# Patient Record
Sex: MALE | Race: WHITE | NOT HISPANIC OR LATINO | Employment: OTHER | URBAN - METROPOLITAN AREA
[De-identification: names, ages, dates, MRNs, and addresses within clinical notes are randomized per-mention and may not be internally consistent; named-entity substitution may affect disease eponyms.]

---

## 2017-01-17 ENCOUNTER — COMMUNICATION - HEALTHEAST (OUTPATIENT)
Dept: FAMILY MEDICINE | Facility: CLINIC | Age: 72
End: 2017-01-17

## 2017-01-17 DIAGNOSIS — M72.0 DUPUYTREN'S CONTRACTURE: ICD-10-CM

## 2017-02-02 ENCOUNTER — RECORDS - HEALTHEAST (OUTPATIENT)
Dept: ADMINISTRATIVE | Facility: OTHER | Age: 72
End: 2017-02-02

## 2017-04-07 ENCOUNTER — OFFICE VISIT - HEALTHEAST (OUTPATIENT)
Dept: FAMILY MEDICINE | Facility: CLINIC | Age: 72
End: 2017-04-07

## 2017-04-07 DIAGNOSIS — D12.6 BENIGN NEOPLASM OF COLON: ICD-10-CM

## 2017-04-07 DIAGNOSIS — E78.5 HYPERLIPIDEMIA: ICD-10-CM

## 2017-04-07 DIAGNOSIS — C61 MALIGNANT NEOPLASM OF PROSTATE (H): ICD-10-CM

## 2017-04-07 DIAGNOSIS — R10.30 GROIN PAIN: ICD-10-CM

## 2017-04-07 DIAGNOSIS — Z00.00 ROUTINE GENERAL MEDICAL EXAMINATION AT A HEALTH CARE FACILITY: ICD-10-CM

## 2017-04-07 LAB
CHOLEST SERPL-MCNC: 191 MG/DL
FASTING STATUS PATIENT QL REPORTED: YES
HDLC SERPL-MCNC: 52 MG/DL
LDLC SERPL CALC-MCNC: 112 MG/DL
PSA SERPL-MCNC: <0.1 NG/ML (ref 0–4.5)
TRIGL SERPL-MCNC: 134 MG/DL

## 2017-04-07 ASSESSMENT — MIFFLIN-ST. JEOR: SCORE: 1462.27

## 2017-04-19 ENCOUNTER — COMMUNICATION - HEALTHEAST (OUTPATIENT)
Dept: FAMILY MEDICINE | Facility: CLINIC | Age: 72
End: 2017-04-19

## 2017-04-20 ENCOUNTER — COMMUNICATION - HEALTHEAST (OUTPATIENT)
Dept: FAMILY MEDICINE | Facility: CLINIC | Age: 72
End: 2017-04-20

## 2017-04-24 ENCOUNTER — COMMUNICATION - HEALTHEAST (OUTPATIENT)
Dept: FAMILY MEDICINE | Facility: CLINIC | Age: 72
End: 2017-04-24

## 2017-04-24 DIAGNOSIS — E78.5 HYPERLIPIDEMIA: ICD-10-CM

## 2017-04-24 DIAGNOSIS — K21.9 GERD (GASTROESOPHAGEAL REFLUX DISEASE): ICD-10-CM

## 2017-08-11 ENCOUNTER — COMMUNICATION - HEALTHEAST (OUTPATIENT)
Dept: FAMILY MEDICINE | Facility: CLINIC | Age: 72
End: 2017-08-11

## 2017-08-15 ENCOUNTER — COMMUNICATION - HEALTHEAST (OUTPATIENT)
Dept: FAMILY MEDICINE | Facility: CLINIC | Age: 72
End: 2017-08-15

## 2017-08-15 DIAGNOSIS — M54.50 LOW BACK PAIN: ICD-10-CM

## 2017-08-17 ENCOUNTER — OFFICE VISIT - HEALTHEAST (OUTPATIENT)
Dept: FAMILY MEDICINE | Facility: CLINIC | Age: 72
End: 2017-08-17

## 2017-08-17 DIAGNOSIS — M54.50 LOW BACK PAIN: ICD-10-CM

## 2017-08-17 ASSESSMENT — MIFFLIN-ST. JEOR: SCORE: 1453.2

## 2017-08-21 ENCOUNTER — COMMUNICATION - HEALTHEAST (OUTPATIENT)
Dept: FAMILY MEDICINE | Facility: CLINIC | Age: 72
End: 2017-08-21

## 2017-08-21 DIAGNOSIS — M54.50 LOW BACK PAIN: ICD-10-CM

## 2017-08-29 ENCOUNTER — HOSPITAL ENCOUNTER (OUTPATIENT)
Dept: MRI IMAGING | Facility: HOSPITAL | Age: 72
Discharge: HOME | End: 2017-08-29
Attending: FAMILY MEDICINE

## 2017-08-29 DIAGNOSIS — M54.50 LOW BACK PAIN: ICD-10-CM

## 2017-09-12 ENCOUNTER — COMMUNICATION - HEALTHEAST (OUTPATIENT)
Dept: FAMILY MEDICINE | Facility: CLINIC | Age: 72
End: 2017-09-12

## 2017-09-12 DIAGNOSIS — R93.89 ABNORMAL MRI: ICD-10-CM

## 2017-09-13 ENCOUNTER — COMMUNICATION - HEALTHEAST (OUTPATIENT)
Dept: FAMILY MEDICINE | Facility: CLINIC | Age: 72
End: 2017-09-13

## 2017-09-14 ENCOUNTER — HOSPITAL ENCOUNTER (OUTPATIENT)
Dept: CT IMAGING | Facility: HOSPITAL | Age: 72
Discharge: HOME | End: 2017-09-14
Attending: FAMILY MEDICINE

## 2017-09-14 ENCOUNTER — COMMUNICATION - HEALTHEAST (OUTPATIENT)
Dept: TELEHEALTH | Facility: CLINIC | Age: 72
End: 2017-09-14

## 2017-09-14 DIAGNOSIS — R93.89 ABNORMAL MRI: ICD-10-CM

## 2017-11-06 ENCOUNTER — AMBULATORY - HEALTHEAST (OUTPATIENT)
Dept: NURSING | Facility: CLINIC | Age: 72
End: 2017-11-06

## 2017-11-06 DIAGNOSIS — Z23 NEEDS FLU SHOT: ICD-10-CM

## 2018-04-09 ENCOUNTER — OFFICE VISIT - HEALTHEAST (OUTPATIENT)
Dept: FAMILY MEDICINE | Facility: CLINIC | Age: 73
End: 2018-04-09

## 2018-04-09 DIAGNOSIS — D12.6 BENIGN NEOPLASM OF COLON: ICD-10-CM

## 2018-04-09 DIAGNOSIS — C61 MALIGNANT NEOPLASM OF PROSTATE (H): ICD-10-CM

## 2018-04-09 DIAGNOSIS — Z12.11 SCREEN FOR COLON CANCER: ICD-10-CM

## 2018-04-09 DIAGNOSIS — E78.5 HYPERLIPIDEMIA: ICD-10-CM

## 2018-04-09 DIAGNOSIS — Z00.00 MEDICARE ANNUAL WELLNESS VISIT, SUBSEQUENT: ICD-10-CM

## 2018-04-09 DIAGNOSIS — K21.9 ESOPHAGEAL REFLUX: ICD-10-CM

## 2018-04-09 DIAGNOSIS — N20.0 NEPHROLITHIASIS: ICD-10-CM

## 2018-04-09 LAB
ALBUMIN SERPL-MCNC: 4 G/DL (ref 3.5–5)
ALP SERPL-CCNC: 68 U/L (ref 45–120)
ALT SERPL W P-5'-P-CCNC: 22 U/L (ref 0–45)
ANION GAP SERPL CALCULATED.3IONS-SCNC: 9 MMOL/L (ref 5–18)
AST SERPL W P-5'-P-CCNC: 29 U/L (ref 0–40)
BILIRUB DIRECT SERPL-MCNC: 0.3 MG/DL
BILIRUB SERPL-MCNC: 0.9 MG/DL (ref 0–1)
BUN SERPL-MCNC: 17 MG/DL (ref 8–28)
CALCIUM SERPL-MCNC: 9.4 MG/DL (ref 8.5–10.5)
CHLORIDE BLD-SCNC: 103 MMOL/L (ref 98–107)
CHOLEST SERPL-MCNC: 171 MG/DL
CO2 SERPL-SCNC: 25 MMOL/L (ref 22–31)
CREAT SERPL-MCNC: 0.97 MG/DL (ref 0.7–1.3)
ERYTHROCYTE [DISTWIDTH] IN BLOOD BY AUTOMATED COUNT: 11.9 % (ref 11–14.5)
FASTING STATUS PATIENT QL REPORTED: YES
GFR SERPL CREATININE-BSD FRML MDRD: >60 ML/MIN/1.73M2
GLUCOSE BLD-MCNC: 87 MG/DL (ref 70–125)
HCT VFR BLD AUTO: 42.1 % (ref 40–54)
HDLC SERPL-MCNC: 64 MG/DL
HGB BLD-MCNC: 14.3 G/DL (ref 14–18)
LDLC SERPL CALC-MCNC: 97 MG/DL
MCH RBC QN AUTO: 30.3 PG (ref 27–34)
MCHC RBC AUTO-ENTMCNC: 33.9 G/DL (ref 32–36)
MCV RBC AUTO: 89 FL (ref 80–100)
PLATELET # BLD AUTO: 206 THOU/UL (ref 140–440)
PMV BLD AUTO: 6.7 FL (ref 7–10)
POTASSIUM BLD-SCNC: 4.4 MMOL/L (ref 3.5–5)
PROT SERPL-MCNC: 7.2 G/DL (ref 6–8)
PSA SERPL-MCNC: <0.1 NG/ML (ref 0–6.5)
RBC # BLD AUTO: 4.71 MILL/UL (ref 4.4–6.2)
SODIUM SERPL-SCNC: 137 MMOL/L (ref 136–145)
TRIGL SERPL-MCNC: 48 MG/DL
WBC: 4 THOU/UL (ref 4–11)

## 2018-04-09 ASSESSMENT — MIFFLIN-ST. JEOR: SCORE: 1439.59

## 2018-05-17 ENCOUNTER — OFFICE VISIT - HEALTHEAST (OUTPATIENT)
Dept: FAMILY MEDICINE | Facility: CLINIC | Age: 73
End: 2018-05-17

## 2018-05-17 DIAGNOSIS — H69.90 EUSTACHIAN TUBE DYSFUNCTION: ICD-10-CM

## 2018-05-17 DIAGNOSIS — H93.8X2 PLUGGED FEELING IN EAR, LEFT: ICD-10-CM

## 2018-05-17 DIAGNOSIS — E78.5 HYPERLIPIDEMIA: ICD-10-CM

## 2018-05-17 ASSESSMENT — MIFFLIN-ST. JEOR: SCORE: 1425.98

## 2018-05-28 ENCOUNTER — COMMUNICATION - HEALTHEAST (OUTPATIENT)
Dept: FAMILY MEDICINE | Facility: CLINIC | Age: 73
End: 2018-05-28

## 2018-05-28 DIAGNOSIS — K21.9 GERD (GASTROESOPHAGEAL REFLUX DISEASE): ICD-10-CM

## 2018-06-14 ENCOUNTER — COMMUNICATION - HEALTHEAST (OUTPATIENT)
Dept: SCHEDULING | Facility: CLINIC | Age: 73
End: 2018-06-14

## 2018-06-14 ENCOUNTER — OFFICE VISIT - HEALTHEAST (OUTPATIENT)
Dept: FAMILY MEDICINE | Facility: CLINIC | Age: 73
End: 2018-06-14

## 2018-06-14 DIAGNOSIS — S61.012A LACERATION OF LEFT THUMB WITHOUT FOREIGN BODY WITHOUT DAMAGE TO NAIL, INITIAL ENCOUNTER: ICD-10-CM

## 2018-06-14 ASSESSMENT — MIFFLIN-ST. JEOR: SCORE: 1424.69

## 2018-06-25 ENCOUNTER — OFFICE VISIT - HEALTHEAST (OUTPATIENT)
Dept: FAMILY MEDICINE | Facility: CLINIC | Age: 73
End: 2018-06-25

## 2018-06-25 DIAGNOSIS — Z48.02 ENCOUNTER FOR REMOVAL OF SUTURES: ICD-10-CM

## 2018-06-25 ASSESSMENT — MIFFLIN-ST. JEOR: SCORE: 1425.98

## 2018-06-26 ENCOUNTER — COMMUNICATION - HEALTHEAST (OUTPATIENT)
Dept: FAMILY MEDICINE | Facility: CLINIC | Age: 73
End: 2018-06-26

## 2018-06-26 DIAGNOSIS — E78.5 HYPERLIPIDEMIA: ICD-10-CM

## 2018-09-18 ENCOUNTER — RECORDS - HEALTHEAST (OUTPATIENT)
Dept: ADMINISTRATIVE | Facility: OTHER | Age: 73
End: 2018-09-18

## 2018-10-29 ENCOUNTER — AMBULATORY - HEALTHEAST (OUTPATIENT)
Dept: NURSING | Facility: CLINIC | Age: 73
End: 2018-10-29

## 2018-10-29 DIAGNOSIS — Z23 FLU VACCINE NEED: ICD-10-CM

## 2019-01-17 ENCOUNTER — OFFICE VISIT - HEALTHEAST (OUTPATIENT)
Dept: FAMILY MEDICINE | Facility: CLINIC | Age: 74
End: 2019-01-17

## 2019-01-17 DIAGNOSIS — C61 MALIGNANT NEOPLASM OF PROSTATE (H): ICD-10-CM

## 2019-01-17 DIAGNOSIS — Z01.818 PREOP GENERAL PHYSICAL EXAM: ICD-10-CM

## 2019-01-17 DIAGNOSIS — E78.5 HYPERLIPIDEMIA, UNSPECIFIED HYPERLIPIDEMIA TYPE: ICD-10-CM

## 2019-01-17 DIAGNOSIS — K21.9 GASTROESOPHAGEAL REFLUX DISEASE WITHOUT ESOPHAGITIS: ICD-10-CM

## 2019-01-17 DIAGNOSIS — M72.0 CONTRACTURE OF PALMAR FASCIA: ICD-10-CM

## 2019-01-17 LAB
ALBUMIN SERPL-MCNC: 4.1 G/DL (ref 3.5–5)
ALP SERPL-CCNC: 81 U/L (ref 45–120)
ALT SERPL W P-5'-P-CCNC: 20 U/L (ref 0–45)
ANION GAP SERPL CALCULATED.3IONS-SCNC: 11 MMOL/L (ref 5–18)
AST SERPL W P-5'-P-CCNC: 23 U/L (ref 0–40)
ATRIAL RATE - MUSE: 71 BPM
BILIRUB DIRECT SERPL-MCNC: 0.2 MG/DL
BILIRUB SERPL-MCNC: 0.7 MG/DL (ref 0–1)
BUN SERPL-MCNC: 19 MG/DL (ref 8–28)
CALCIUM SERPL-MCNC: 9.8 MG/DL (ref 8.5–10.5)
CHLORIDE BLD-SCNC: 101 MMOL/L (ref 98–107)
CHOLEST SERPL-MCNC: 171 MG/DL
CO2 SERPL-SCNC: 26 MMOL/L (ref 22–31)
CREAT SERPL-MCNC: 0.94 MG/DL (ref 0.7–1.3)
DIASTOLIC BLOOD PRESSURE - MUSE: NORMAL MMHG
ERYTHROCYTE [DISTWIDTH] IN BLOOD BY AUTOMATED COUNT: 11 % (ref 11–14.5)
FASTING STATUS PATIENT QL REPORTED: YES
GFR SERPL CREATININE-BSD FRML MDRD: >60 ML/MIN/1.73M2
GLUCOSE BLD-MCNC: 100 MG/DL (ref 70–125)
HCT VFR BLD AUTO: 45.2 % (ref 40–54)
HDLC SERPL-MCNC: 55 MG/DL
HGB BLD-MCNC: 15.1 G/DL (ref 14–18)
INTERPRETATION ECG - MUSE: NORMAL
LDLC SERPL CALC-MCNC: 94 MG/DL
MCH RBC QN AUTO: 30.2 PG (ref 27–34)
MCHC RBC AUTO-ENTMCNC: 33.3 G/DL (ref 32–36)
MCV RBC AUTO: 91 FL (ref 80–100)
P AXIS - MUSE: 72 DEGREES
PLATELET # BLD AUTO: 257 THOU/UL (ref 140–440)
PMV BLD AUTO: 6.8 FL (ref 7–10)
POTASSIUM BLD-SCNC: 4.3 MMOL/L (ref 3.5–5)
PR INTERVAL - MUSE: 166 MS
PROT SERPL-MCNC: 7.4 G/DL (ref 6–8)
PSA SERPL-MCNC: <0.1 NG/ML (ref 0–6.5)
QRS DURATION - MUSE: 106 MS
QT - MUSE: 372 MS
QTC - MUSE: 404 MS
R AXIS - MUSE: -23 DEGREES
RBC # BLD AUTO: 5 MILL/UL (ref 4.4–6.2)
SODIUM SERPL-SCNC: 138 MMOL/L (ref 136–145)
SYSTOLIC BLOOD PRESSURE - MUSE: NORMAL MMHG
T AXIS - MUSE: 12 DEGREES
TRIGL SERPL-MCNC: 111 MG/DL
VENTRICULAR RATE- MUSE: 71 BPM
WBC: 5.3 THOU/UL (ref 4–11)

## 2019-01-17 ASSESSMENT — MIFFLIN-ST. JEOR: SCORE: 1448.2

## 2019-02-20 ENCOUNTER — RECORDS - HEALTHEAST (OUTPATIENT)
Dept: ADMINISTRATIVE | Facility: OTHER | Age: 74
End: 2019-02-20

## 2019-05-31 ENCOUNTER — COMMUNICATION - HEALTHEAST (OUTPATIENT)
Dept: FAMILY MEDICINE | Facility: CLINIC | Age: 74
End: 2019-05-31

## 2019-05-31 DIAGNOSIS — K21.9 GERD (GASTROESOPHAGEAL REFLUX DISEASE): ICD-10-CM

## 2019-06-30 ENCOUNTER — COMMUNICATION - HEALTHEAST (OUTPATIENT)
Dept: FAMILY MEDICINE | Facility: CLINIC | Age: 74
End: 2019-06-30

## 2019-06-30 DIAGNOSIS — E78.5 HYPERLIPIDEMIA: ICD-10-CM

## 2019-07-24 ENCOUNTER — RECORDS - HEALTHEAST (OUTPATIENT)
Dept: ADMINISTRATIVE | Facility: OTHER | Age: 74
End: 2019-07-24

## 2019-10-14 ENCOUNTER — AMBULATORY - HEALTHEAST (OUTPATIENT)
Dept: NURSING | Facility: CLINIC | Age: 74
End: 2019-10-14

## 2019-10-14 DIAGNOSIS — Z23 FLU VACCINE NEED: ICD-10-CM

## 2019-12-05 ENCOUNTER — COMMUNICATION - HEALTHEAST (OUTPATIENT)
Dept: FAMILY MEDICINE | Facility: CLINIC | Age: 74
End: 2019-12-05

## 2019-12-05 DIAGNOSIS — K21.9 GERD (GASTROESOPHAGEAL REFLUX DISEASE): ICD-10-CM

## 2020-02-07 ENCOUNTER — COMMUNICATION - HEALTHEAST (OUTPATIENT)
Dept: LAB | Facility: CLINIC | Age: 75
End: 2020-02-07

## 2020-02-07 DIAGNOSIS — Z12.5 SCREENING FOR PROSTATE CANCER: ICD-10-CM

## 2020-02-07 DIAGNOSIS — E78.5 HYPERLIPIDEMIA, UNSPECIFIED HYPERLIPIDEMIA TYPE: ICD-10-CM

## 2020-02-10 ENCOUNTER — COMMUNICATION - HEALTHEAST (OUTPATIENT)
Dept: FAMILY MEDICINE | Facility: CLINIC | Age: 75
End: 2020-02-10

## 2020-02-10 DIAGNOSIS — K21.9 GERD (GASTROESOPHAGEAL REFLUX DISEASE): ICD-10-CM

## 2020-02-15 RX ORDER — PANTOPRAZOLE SODIUM 40 MG/1
TABLET, DELAYED RELEASE ORAL
Qty: 90 TABLET | Refills: 1 | Status: SHIPPED | OUTPATIENT
Start: 2020-02-15 | End: 2021-12-07

## 2020-02-26 ENCOUNTER — AMBULATORY - HEALTHEAST (OUTPATIENT)
Dept: LAB | Facility: CLINIC | Age: 75
End: 2020-02-26

## 2020-02-26 DIAGNOSIS — Z12.5 SCREENING FOR PROSTATE CANCER: ICD-10-CM

## 2020-02-26 DIAGNOSIS — E78.5 HYPERLIPIDEMIA, UNSPECIFIED HYPERLIPIDEMIA TYPE: ICD-10-CM

## 2020-02-26 LAB
ALBUMIN SERPL-MCNC: 4.1 G/DL (ref 3.5–5)
ALP SERPL-CCNC: 67 U/L (ref 45–120)
ALT SERPL W P-5'-P-CCNC: 31 U/L (ref 0–45)
ANION GAP SERPL CALCULATED.3IONS-SCNC: 11 MMOL/L (ref 5–18)
AST SERPL W P-5'-P-CCNC: 28 U/L (ref 0–40)
BILIRUB DIRECT SERPL-MCNC: 0.2 MG/DL
BILIRUB SERPL-MCNC: 0.8 MG/DL (ref 0–1)
BUN SERPL-MCNC: 16 MG/DL (ref 8–28)
CALCIUM SERPL-MCNC: 9.6 MG/DL (ref 8.5–10.5)
CHLORIDE BLD-SCNC: 100 MMOL/L (ref 98–107)
CHOLEST SERPL-MCNC: 192 MG/DL
CO2 SERPL-SCNC: 26 MMOL/L (ref 22–31)
CREAT SERPL-MCNC: 1.18 MG/DL (ref 0.7–1.3)
FASTING STATUS PATIENT QL REPORTED: YES
GFR SERPL CREATININE-BSD FRML MDRD: >60 ML/MIN/1.73M2
GLUCOSE BLD-MCNC: 96 MG/DL (ref 70–125)
HDLC SERPL-MCNC: 61 MG/DL
LDLC SERPL CALC-MCNC: 113 MG/DL
POTASSIUM BLD-SCNC: 4.1 MMOL/L (ref 3.5–5)
PROT SERPL-MCNC: 7.2 G/DL (ref 6–8)
PSA SERPL-MCNC: <0.1 NG/ML (ref 0–6.5)
SODIUM SERPL-SCNC: 137 MMOL/L (ref 136–145)
TRIGL SERPL-MCNC: 91 MG/DL

## 2020-03-09 ENCOUNTER — OFFICE VISIT - HEALTHEAST (OUTPATIENT)
Dept: FAMILY MEDICINE | Facility: CLINIC | Age: 75
End: 2020-03-09

## 2020-03-09 DIAGNOSIS — M72.0 CONTRACTURE OF PALMAR FASCIA: ICD-10-CM

## 2020-03-09 DIAGNOSIS — R13.10 DYSPHAGIA, UNSPECIFIED TYPE: ICD-10-CM

## 2020-03-09 DIAGNOSIS — Z00.00 MEDICARE ANNUAL WELLNESS VISIT, SUBSEQUENT: ICD-10-CM

## 2020-03-09 DIAGNOSIS — K21.9 GASTROESOPHAGEAL REFLUX DISEASE WITHOUT ESOPHAGITIS: ICD-10-CM

## 2020-03-09 DIAGNOSIS — Z85.46 HISTORY OF PROSTATE CANCER: ICD-10-CM

## 2020-03-09 DIAGNOSIS — N20.0 NEPHROLITHIASIS: ICD-10-CM

## 2020-03-09 DIAGNOSIS — E78.5 HYPERLIPIDEMIA, UNSPECIFIED HYPERLIPIDEMIA TYPE: ICD-10-CM

## 2020-03-09 DIAGNOSIS — D12.6 BENIGN NEOPLASM OF COLON, UNSPECIFIED PART OF COLON: ICD-10-CM

## 2020-03-09 ASSESSMENT — MIFFLIN-ST. JEOR: SCORE: 1460.44

## 2020-03-29 ENCOUNTER — COMMUNICATION - HEALTHEAST (OUTPATIENT)
Dept: FAMILY MEDICINE | Facility: CLINIC | Age: 75
End: 2020-03-29

## 2020-04-21 ENCOUNTER — COMMUNICATION - HEALTHEAST (OUTPATIENT)
Dept: FAMILY MEDICINE | Facility: CLINIC | Age: 75
End: 2020-04-21

## 2020-04-21 DIAGNOSIS — R13.10 DYSPHAGIA, UNSPECIFIED TYPE: ICD-10-CM

## 2020-04-21 DIAGNOSIS — K21.9 GASTROESOPHAGEAL REFLUX DISEASE WITHOUT ESOPHAGITIS: ICD-10-CM

## 2020-04-27 ENCOUNTER — RECORDS - HEALTHEAST (OUTPATIENT)
Dept: ADMINISTRATIVE | Facility: OTHER | Age: 75
End: 2020-04-27

## 2020-05-04 ENCOUNTER — RECORDS - HEALTHEAST (OUTPATIENT)
Dept: ADMINISTRATIVE | Facility: OTHER | Age: 75
End: 2020-05-04

## 2020-05-05 ENCOUNTER — RECORDS - HEALTHEAST (OUTPATIENT)
Dept: ADMINISTRATIVE | Facility: OTHER | Age: 75
End: 2020-05-05

## 2020-05-21 ENCOUNTER — HOSPITAL ENCOUNTER (OUTPATIENT)
Dept: RADIOLOGY | Facility: HOSPITAL | Age: 75
Discharge: HOME | End: 2020-05-21
Attending: INTERNAL MEDICINE

## 2020-05-21 DIAGNOSIS — K21.9 GERD WITHOUT ESOPHAGITIS: ICD-10-CM

## 2020-05-21 DIAGNOSIS — K31.9 GASTRIC ERYTHEMA: ICD-10-CM

## 2020-05-29 ENCOUNTER — RECORDS - HEALTHEAST (OUTPATIENT)
Dept: ADMINISTRATIVE | Facility: OTHER | Age: 75
End: 2020-05-29

## 2020-06-18 ENCOUNTER — COMMUNICATION - HEALTHEAST (OUTPATIENT)
Dept: FAMILY MEDICINE | Facility: CLINIC | Age: 75
End: 2020-06-18

## 2020-06-18 DIAGNOSIS — E78.5 HYPERLIPIDEMIA: ICD-10-CM

## 2020-08-25 ENCOUNTER — RECORDS - HEALTHEAST (OUTPATIENT)
Dept: ADMINISTRATIVE | Facility: OTHER | Age: 75
End: 2020-08-25

## 2021-01-12 ENCOUNTER — COMMUNICATION - HEALTHEAST (OUTPATIENT)
Dept: FAMILY MEDICINE | Facility: CLINIC | Age: 76
End: 2021-01-12

## 2021-01-18 ENCOUNTER — OFFICE VISIT - HEALTHEAST (OUTPATIENT)
Dept: FAMILY MEDICINE | Facility: CLINIC | Age: 76
End: 2021-01-18

## 2021-01-18 DIAGNOSIS — M25.531 RIGHT WRIST PAIN: ICD-10-CM

## 2021-01-18 DIAGNOSIS — E78.5 HYPERLIPIDEMIA, UNSPECIFIED HYPERLIPIDEMIA TYPE: ICD-10-CM

## 2021-01-18 DIAGNOSIS — Z12.5 SCREENING FOR PROSTATE CANCER: ICD-10-CM

## 2021-01-18 DIAGNOSIS — Z85.46 HISTORY OF PROSTATE CANCER: ICD-10-CM

## 2021-01-18 DIAGNOSIS — Z11.59 ENCOUNTER FOR HEPATITIS C SCREENING TEST FOR LOW RISK PATIENT: ICD-10-CM

## 2021-01-18 DIAGNOSIS — K21.9 GASTROESOPHAGEAL REFLUX DISEASE WITHOUT ESOPHAGITIS: ICD-10-CM

## 2021-01-18 DIAGNOSIS — Z01.818 PREOPERATIVE EXAMINATION: ICD-10-CM

## 2021-01-18 LAB
ALBUMIN SERPL-MCNC: 4.2 G/DL (ref 3.5–5)
ALP SERPL-CCNC: 71 U/L (ref 45–120)
ALT SERPL W P-5'-P-CCNC: 23 U/L (ref 0–45)
ANION GAP SERPL CALCULATED.3IONS-SCNC: 11 MMOL/L (ref 5–18)
AST SERPL W P-5'-P-CCNC: 28 U/L (ref 0–40)
BILIRUB DIRECT SERPL-MCNC: 0.3 MG/DL
BILIRUB SERPL-MCNC: 0.8 MG/DL (ref 0–1)
BUN SERPL-MCNC: 17 MG/DL (ref 8–28)
CALCIUM SERPL-MCNC: 9.3 MG/DL (ref 8.5–10.5)
CHLORIDE BLD-SCNC: 104 MMOL/L (ref 98–107)
CHOLEST SERPL-MCNC: 175 MG/DL
CO2 SERPL-SCNC: 23 MMOL/L (ref 22–31)
CREAT SERPL-MCNC: 0.98 MG/DL (ref 0.7–1.3)
ERYTHROCYTE [DISTWIDTH] IN BLOOD BY AUTOMATED COUNT: 11.8 % (ref 11–14.5)
FASTING STATUS PATIENT QL REPORTED: YES
GFR SERPL CREATININE-BSD FRML MDRD: >60 ML/MIN/1.73M2
GLUCOSE BLD-MCNC: 95 MG/DL (ref 70–125)
HCT VFR BLD AUTO: 44.1 % (ref 40–54)
HDLC SERPL-MCNC: 60 MG/DL
HGB BLD-MCNC: 14.9 G/DL (ref 14–18)
LDLC SERPL CALC-MCNC: 100 MG/DL
MCH RBC QN AUTO: 30 PG (ref 27–34)
MCHC RBC AUTO-ENTMCNC: 33.7 G/DL (ref 32–36)
MCV RBC AUTO: 89 FL (ref 80–100)
PLATELET # BLD AUTO: 198 THOU/UL (ref 140–440)
PMV BLD AUTO: 6.8 FL (ref 7–10)
POTASSIUM BLD-SCNC: 4.3 MMOL/L (ref 3.5–5)
PROT SERPL-MCNC: 7.1 G/DL (ref 6–8)
PSA SERPL-MCNC: <0.1 NG/ML (ref 0–6.5)
RBC # BLD AUTO: 4.95 MILL/UL (ref 4.4–6.2)
SODIUM SERPL-SCNC: 138 MMOL/L (ref 136–145)
TRIGL SERPL-MCNC: 77 MG/DL
WBC: 5.3 THOU/UL (ref 4–11)

## 2021-01-18 ASSESSMENT — MIFFLIN-ST. JEOR: SCORE: 1443.66

## 2021-01-19 LAB — HCV AB SERPL QL IA: NEGATIVE

## 2021-04-29 ENCOUNTER — COMMUNICATION - HEALTHEAST (OUTPATIENT)
Dept: FAMILY MEDICINE | Facility: CLINIC | Age: 76
End: 2021-04-29

## 2021-04-29 DIAGNOSIS — E78.5 HYPERLIPIDEMIA: ICD-10-CM

## 2021-04-30 RX ORDER — ATORVASTATIN CALCIUM 20 MG/1
TABLET, FILM COATED ORAL
Qty: 90 TABLET | Refills: 2 | Status: SHIPPED | OUTPATIENT
Start: 2021-04-30 | End: 2022-04-20

## 2021-05-29 ENCOUNTER — HEALTH MAINTENANCE LETTER (OUTPATIENT)
Age: 76
End: 2021-05-29

## 2021-05-29 NOTE — TELEPHONE ENCOUNTER
Refill Approved    Rx renewed per Medication Renewal Policy. Medication was last renewed on 5/29/18.    Jessica Gaspar, [facility] Triage/Med Refill 5/31/2019     Requested Prescriptions   Pending Prescriptions Disp Refills     pantoprazole (PROTONIX) 40 MG tablet [Pharmacy Med Name: PANTOPRAZOLE SOD 40MG TAB] 90 tablet 3     Sig: TAKE 1 TABLET BY MOUTH ONCE DAILY       GI Medications Refill Protocol Passed - 5/31/2019  8:39 AM        Passed - PCP or prescribing provider visit in last 12 or next 3 months.     Last office visit with prescriber/PCP: 5/17/2018 Scar Ambrosio MD OR same dept: 6/25/2018 Madhav Myles MD OR same specialty: 6/25/2018 Madhav Myles MD  Last physical: 1/17/2019 Last MTM visit: Visit date not found   Next visit within 3 mo: Visit date not found  Next physical within 3 mo: Visit date not found  Prescriber OR PCP: Scar Ambrosio MD  Last diagnosis associated with med order: 1. GERD (gastroesophageal reflux disease)  - pantoprazole (PROTONIX) 40 MG tablet [Pharmacy Med Name: PANTOPRAZOLE SOD 40MG TAB]; TAKE 1 TABLET BY MOUTH ONCE DAILY  Dispense: 90 tablet; Refill: 3    If protocol passes may refill for 12 months if within 3 months of last provider visit (or a total of 15 months).

## 2021-05-30 VITALS — WEIGHT: 169 LBS | HEIGHT: 67 IN | BODY MASS INDEX: 26.53 KG/M2

## 2021-05-30 NOTE — TELEPHONE ENCOUNTER
Refill Approved    Rx renewed per Medication Renewal Policy. Medication was last renewed on 5/17/18 #90 R-3.    Last OV 1/17/19    Bekah Henry, [facility] Triage/Med Refill 7/1/2019     Requested Prescriptions   Pending Prescriptions Disp Refills     atorvastatin (LIPITOR) 20 MG tablet [Pharmacy Med Name: ATORVASTATIN 20MG   TAB] 90 tablet 3     Sig: TAKE 1 TABLET BY MOUTH ONCE DAILY       Statins Refill Protocol (Hmg CoA Reductase Inhibitors) Passed - 6/30/2019  9:04 AM        Passed - PCP or prescribing provider visit in past 12 months      Last office visit with prescriber/PCP: 5/17/2018 Scar Ambrosio MD OR same dept: Visit date not found OR same specialty: 6/25/2018 Madhav Myles MD  Last physical: 1/17/2019 Last MTM visit: Visit date not found   Next visit within 3 mo: Visit date not found  Next physical within 3 mo: Visit date not found  Prescriber OR PCP: Scar Ambrosio MD  Last diagnosis associated with med order: 1. Hyperlipidemia  - atorvastatin (LIPITOR) 20 MG tablet [Pharmacy Med Name: ATORVASTATIN 20MG   TAB]; TAKE 1 TABLET BY MOUTH ONCE DAILY  Dispense: 90 tablet; Refill: 3    If protocol passes may refill for 12 months if within 3 months of last provider visit (or a total of 15 months).

## 2021-05-31 VITALS — BODY MASS INDEX: 26.21 KG/M2 | WEIGHT: 167 LBS | HEIGHT: 67 IN

## 2021-06-01 VITALS — WEIGHT: 161 LBS | HEIGHT: 67 IN | BODY MASS INDEX: 25.27 KG/M2

## 2021-06-01 VITALS — HEIGHT: 67 IN | WEIGHT: 164 LBS | BODY MASS INDEX: 25.74 KG/M2

## 2021-06-01 VITALS — WEIGHT: 160.72 LBS | HEIGHT: 67 IN | BODY MASS INDEX: 25.22 KG/M2

## 2021-06-01 VITALS — HEIGHT: 67 IN | BODY MASS INDEX: 25.27 KG/M2 | WEIGHT: 161 LBS

## 2021-06-02 VITALS — WEIGHT: 167 LBS | BODY MASS INDEX: 26.21 KG/M2 | HEIGHT: 67 IN

## 2021-06-04 VITALS
HEART RATE: 88 BPM | SYSTOLIC BLOOD PRESSURE: 138 MMHG | HEIGHT: 67 IN | DIASTOLIC BLOOD PRESSURE: 80 MMHG | RESPIRATION RATE: 16 BRPM | BODY MASS INDEX: 26.63 KG/M2 | WEIGHT: 169.7 LBS

## 2021-06-04 NOTE — TELEPHONE ENCOUNTER
Refill Approved    Rx renewed per Medication Renewal Policy. Medication was last renewed on 5/31/19.    Kae Jones, [facility] Connection Triage/Med Refill 12/6/2019     Requested Prescriptions   Pending Prescriptions Disp Refills     pantoprazole (PROTONIX) 40 MG tablet [Pharmacy Med Name: PANTOPRAZOLE SOD 40MG TAB] 90 tablet 1     Sig: TAKE 1 TABLET BY MOUTH ONCE DAILY       GI Medications Refill Protocol Passed - 12/5/2019 10:19 AM        Passed - PCP or prescribing provider visit in last 12 or next 3 months.     Last office visit with prescriber/PCP: 5/17/2018 Scar Ambrosio MD OR same dept: Visit date not found OR same specialty: 6/25/2018 Madhav Myles MD  Last physical: 1/17/2019 Last MTM visit: Visit date not found   Next visit within 3 mo: Visit date not found  Next physical within 3 mo: Visit date not found  Prescriber OR PCP: Scar Ambrosio MD  Last diagnosis associated with med order: 1. GERD (gastroesophageal reflux disease)  - pantoprazole (PROTONIX) 40 MG tablet [Pharmacy Med Name: PANTOPRAZOLE SOD 40MG TAB]; TAKE 1 TABLET BY MOUTH ONCE DAILY  Dispense: 90 tablet; Refill: 1    If protocol passes may refill for 12 months if within 3 months of last provider visit (or a total of 15 months).

## 2021-06-05 VITALS
SYSTOLIC BLOOD PRESSURE: 146 MMHG | HEART RATE: 74 BPM | BODY MASS INDEX: 26.06 KG/M2 | TEMPERATURE: 97.7 F | WEIGHT: 166 LBS | DIASTOLIC BLOOD PRESSURE: 81 MMHG | HEIGHT: 67 IN

## 2021-06-05 NOTE — TELEPHONE ENCOUNTER
I entered orders.  However, he is due for a visit with me.  I am not sure if he wants additional testing.

## 2021-06-05 NOTE — TELEPHONE ENCOUNTER
Need lab orders for 02/26 am - pt is coming for lab only apt and wants to get Fasting blood work done.  Please put lab orders in.  Thx

## 2021-06-06 NOTE — PROGRESS NOTES
Assessment and Plan:       1. Medicare annual wellness visit, subsequent    Reviewed the annual wellness visit health risk assessment form  Mini cog score is 4/5  Reviewed falls risk  PHQ-2 score is 0    2. History of prostate cancer  Status post radical prostatectomy  Penile prosthetic    Check a PSA and follow-up with urology if this becomes elevated    3. Hyperlipidemia, unspecified hyperlipidemia type    Reviewed his recent cholesterol numbers  Continue atorvastatin  His recent hepatic profile was normal    4. Benign neoplasm of colon, unspecified part of colon    His colonoscopy revealed two tubular adenomas in September 2018.  He was given September 2023    5. Nephrolithiasis    Continue plenty of liquids  Continue tamsulosin  Follow-up with urology    6. Gastroesophageal reflux disease without esophagitis    He will continue Protonix 40 mg daily  As noted he will start famotidine in the short-term    7. Dupuytren's Contracture    Status post recent surgery    8. Dysphagia, unspecified type  Etiology unclear    May be secondary to gastroesophageal reflux symptoms with irritation versus postnasal drainage  Recommend a trial of famotidine 20 mg in the morning and he will continue Protonix  Consider referral to ENT  Can consider an upper endoscopy as well        The patient's current medical problems were reviewed.    I have had an Advance Directives discussion with the patient.  The following health maintenance schedule was reviewed with the patient and provided in printed form in the after visit summary:   Health Maintenance   Topic Date Due     HEPATITIS C SCREENING  1945     ZOSTER VACCINES (2 of 3) 05/17/2013     MEDICARE ANNUAL WELLNESS VISIT  04/09/2019     FALL RISK ASSESSMENT  03/09/2021     COLORECTAL CANCER SCREENING  09/18/2022     ADVANCE CARE PLANNING  04/09/2023     TD 18+ HE  11/11/2023     LIPID  02/26/2025     PNEUMOCOCCAL IMMUNIZATION 65+ LOW/MEDIUM RISK  Completed     INFLUENZA VACCINE  RULE BASED  Completed        Subjective:   Chief Complaint: Adebayo Burgess is an 74 y.o. male here for an Annual Wellness visit.   HPI: This is a pleasant 74-year-old male presents to the clinic for an annual wellness visit.  His medical history is notable for prostate cancer, hyperlipidemia, gastroesophageal reflux disease with a known hiatal hernia, as well as benign polyps.  Additionally, he has a history of prostate cancer with a previous radical prostatectomy.  He has had a penile prosthesis given a history of erectile dysfunction.  He also has a history of Dupuytren's contracture and did have surgical repair for his right hand by orthopedics previously.    He continues to take atorvastatin 20 mg daily and tolerates the medication well.  He takes a multivitamin and also takes Protonix for reflux symptoms.    He has a history of kidney stones and has followed up with urology.  He recently has been treated with tamsulosin given a recent kidney stone.    Recent laboratory testing showed a creatinine of 1.18 and GFR greater than 60.  His total cholesterol was 192 with triglycerides of 91, HDL of 61, and LDL of 113.  His PSA was less than 0.1.    He has followed up with dermatology on a consistent basis.    Review of systems is notable for an occasional tightness in his throat.  He states that it feels irritated.  It seems his heartburn is well controlled.  Is not sure if he has postnasal drainage.  As noted, he takes Protonix 40 mg daily.    His most recent colonoscopy was in September 2018.  He will be due again in September 2023.    He tolerates physical exertion without difficulty.  He rates his health as good.  He exercises 0-2 times per week.  He does not require any assistance with activities of daily living.    Review of systems is negative for headache, chest pain, palpitations, bowel changes, or urinary concerns.  His mood has been stable.    Remainder of review of systems is negative.      Review of  Systems:    Please see above.  The rest of the review of systems are negative for all systems.    Patient Care Team:  Scar Ambrosio MD as PCP - General  Scar Ambrosio MD as Assigned PCP     Patient Active Problem List   Diagnosis     Male Erectile Disorder     Overweight     Compression Fracture Of Thoracic Vertebral Body     Decrease In Height     Seborrheic Keratosis     Lentigo     Rotator Cuff Tendon Tear     Eustachian Tube Dysfunction     Hemangioma Of The Skin     History of prostate cancer     Skin Neoplasm Of Uncertain Behavior     Hyperlipidemia     Esophageal Reflux     Dupuytren's Contracture     Benign Adenomatous Polyp Of The Large Intestine     Nephrolithiasis     History reviewed. No pertinent past medical history.   Past Surgical History:   Procedure Laterality Date     TX REMV PROSTATE,SUPRAPUBIC,SUBTOTAL      Description: Prostatectomy, Suprapubic;  Recorded: 10/18/2010;      No family history on file.   Social History     Socioeconomic History     Marital status:      Spouse name: Not on file     Number of children: Not on file     Years of education: Not on file     Highest education level: Not on file   Occupational History     Not on file   Social Needs     Financial resource strain: Not on file     Food insecurity     Worry: Not on file     Inability: Not on file     Transportation needs     Medical: Not on file     Non-medical: Not on file   Tobacco Use     Smoking status: Never Smoker     Smokeless tobacco: Never Used   Substance and Sexual Activity     Alcohol use: Not on file     Drug use: Not on file     Sexual activity: Not on file   Lifestyle     Physical activity     Days per week: Not on file     Minutes per session: Not on file     Stress: Not on file   Relationships     Social connections     Talks on phone: Not on file     Gets together: Not on file     Attends Alevism service: Not on file     Active member of club or organization: Not on file      "Attends meetings of clubs or organizations: Not on file     Relationship status: Not on file     Intimate partner violence     Fear of current or ex partner: Not on file     Emotionally abused: Not on file     Physically abused: Not on file     Forced sexual activity: Not on file   Other Topics Concern     Not on file   Social History Narrative     Not on file      Current Outpatient Medications   Medication Sig Dispense Refill     atorvastatin (LIPITOR) 20 MG tablet TAKE 1 TABLET BY MOUTH ONCE DAILY 90 tablet 2     MULTIVIT &MINERALS/FERROUS FUM (MULTI VITAMIN ORAL) Take by mouth.       pantoprazole (PROTONIX) 40 MG tablet TAKE 1 TABLET BY MOUTH ONCE DAILY 90 tablet 1     tamsulosin (FLOMAX) 0.4 mg cap Take 0.4 mg by mouth.       No current facility-administered medications for this visit.       Objective:   Vital Signs:   Visit Vitals  /80   Pulse 88   Resp 16   Ht 5' 6.5\" (1.689 m)   Wt 169 lb 11.2 oz (77 kg)   BMI 26.98 kg/m         VisionScreening:  No exam data present     PHYSICAL EXAM  General appearance: alert, appears stated age and cooperative  Head: Normocephalic, without obvious abnormality, atraumatic  Eyes: conjunctivae/corneas clear. PERRL, EOM's intact.   Ears: normal TM's and external ear canals both ears  Nose: Nares normal. Septum midline. Mucosa normal  Throat: lips, mucosa, and tongue normal; teeth and gums normal  Neck: no adenopathy, supple, symmetrical, trachea midline  Back: symmetric, no curvature. ROM normal. No CVA tenderness.  Lungs: clear to auscultation bilaterally  Heart: regular rate and rhythm, S1, S2 normal, no murmur, click, rub or gallop  Abdomen: soft, non-tender; bowel sounds normal; no masses,  no organomegaly  Genitourinary: Penis is circumcised, no scrotal masses, no inguinal hernia  He has a penile prosthetic  Rectal: Normal sphincter tone, prostate is absent  Extremities: extremities normal, atraumatic, no cyanosis or edema  Skin: Skin color, texture, turgor " normal  Lymph nodes: Cervical nodes normal.  Neurologic: Alert and oriented X 3. Normal coordination and gait      Assessment Results 3/9/2020   Activities of Daily Living No help needed   Instrumental Activities of Daily Living No help needed   Mini Cog Total Score 4   Some recent data might be hidden     A Mini-Cog score of 0-2 suggests the possibility of dementia, score of 3-5 suggests no dementia    Identified Health Risks:     Patient's advanced directive was discussed and I am comfortable with the patient's wishes.

## 2021-06-06 NOTE — TELEPHONE ENCOUNTER
RN cannot approve Refill Request    RN can NOT refill this medication PCP messaged that patient is overdue for Office Visit. Last office visit: 5/17/2018 Scar Ambrosio MD Last Physical: 1/17/2019 Last MTM visit: Visit date not found Last visit same specialty: 6/25/2018 Madhav Myles MD.  Next visit within 3 mo:  3/9/2020  Claire Dawn, [facility] Triage/Med Refill 2/15/2020    Requested Prescriptions   Pending Prescriptions Disp Refills     pantoprazole (PROTONIX) 40 MG tablet [Pharmacy Med Name: Pantoprazole Sodium 40 MG Oral Tablet Delayed Release] 90 tablet 0     Sig: TAKE 1 TABLET BY MOUTH ONCE DAILY       GI Medications Refill Protocol Failed - 2/10/2020 11:30 AM        Failed - PCP or prescribing provider visit in last 12 or next 3 months.     Last office visit with prescriber/PCP: 5/17/2018 Scar Ambrosio MD OR same dept: Visit date not found OR same specialty: 6/25/2018 Madhav Myles MD  Last physical: 1/17/2019 Last MTM visit: Visit date not found   Next visit within 3 mo: Visit date not found  Next physical within 3 mo: Visit date not found  Prescriber OR PCP: Scar Ambrosio MD  Last diagnosis associated with med order: 1. GERD (gastroesophageal reflux disease)  - pantoprazole (PROTONIX) 40 MG tablet [Pharmacy Med Name: Pantoprazole Sodium 40 MG Oral Tablet Delayed Release]; TAKE 1 TABLET BY MOUTH ONCE DAILY  Dispense: 90 tablet; Refill: 0    If protocol passes may refill for 12 months if within 3 months of last provider visit (or a total of 15 months).

## 2021-06-06 NOTE — PATIENT INSTRUCTIONS - HE
Advance Directive  Patient s advance directive was discussed and I am comfortable with the patient s wishes.  Patient Education   Personalized Prevention Plan  You are due for the preventive services outlined below.  Your care team is available to assist you in scheduling these services.  If you have already completed any of these items, please share that information with your care team to update in your medical record.  Health Maintenance   Topic Date Due     HEPATITIS C SCREENING  1945     ZOSTER VACCINES (2 of 3) 05/17/2013     MEDICARE ANNUAL WELLNESS VISIT  04/09/2019     FALL RISK ASSESSMENT  03/09/2021     COLORECTAL CANCER SCREENING  09/18/2022     ADVANCE CARE PLANNING  04/09/2023     TD 18+ HE  11/11/2023     LIPID  02/26/2025     PNEUMOCOCCAL IMMUNIZATION 65+ LOW/MEDIUM RISK  Completed     INFLUENZA VACCINE RULE BASED  Completed         Remain active  You may try famotidine 20 mg in the morning  Contiue protonix  You can consider the Shingrix/shingles vaccine  It was great to see you.

## 2021-06-07 NOTE — TELEPHONE ENCOUNTER
Please call patient.  Please provide referral information.  I entered 2 referrals, one for an upper endoscopy and another for gastroenterology.  I am not sure how soon they will allow him to do an upper endoscopy.  That is why I also entered the referral for gastroenterology as they may have formal recommendations and can put him on a waiting list to get that done as soon as possible.  I also want him to increase pantoprazole/Protonix to twice daily.  If developing severe and persistent symptoms I recommend follow-up sooner as needed.  Please provide the referral information.

## 2021-06-08 NOTE — PROGRESS NOTES
Speech Language/Pathology  Videofluoroscopic Swallow Study     Problem:  Patient Active Problem List   Diagnosis     Male Erectile Disorder     Overweight     Compression Fracture Of Thoracic Vertebral Body     Decrease In Height     Seborrheic Keratosis     Lentigo     Rotator Cuff Tendon Tear     Eustachian Tube Dysfunction     Hemangioma Of The Skin     History of prostate cancer     Skin Neoplasm Of Uncertain Behavior     Hyperlipidemia     Esophageal Reflux     Dupuytren's Contracture     Benign Adenomatous Polyp Of The Large Intestine     Nephrolithiasis     Esophagitis       Onset Date: 5/5/2020 (order date)  Reason for Evaluation: Assess for oropharyngeal dysphagia  Pertinent History: Longstanding GERD (x30 years per patient report); hiatal hernia  Current Diet: Regular textures and thin liquids    Assessment:    Patient demonstrated no oral dysphagia and no pharyngeal dysphagia.    Patient is at minimal aspiration risk with all intake.    Rehab potential: N/A    Recommendations:    Plan: Continue current diet of Regular textures and thin liquids    Strategies: Patient to follow standard safe swallowing precautions, including sitting fully upright for all intake, eating slowly, and taking small bites and sips. Patient to also follow standard GERD precautions. He already uses many of these, but was provided with additional verbal education as well as a written handout.    Speech Therapy is not indicated at this time.    Referrals: N/A     Reviewed history of swallow problem with patient and verbally explained roles of SLP and radiologist. Verbally explained process of VFSS prior to administration of barium. Verbally explained results and recommendations to patient. SLP answered questions and stated that a copy of this report will be faxed to patient's referring provider, Dr. March of [facility]. Additionally, patient's PCP, Dr. Scar Merritt, will have access to report in patient's EMR. Patient verbalized  "understanding.    Subjective:    Patient is a 74-year-old male referred due to reports of dry foods \"sticking\" in his throat, as well as a sensation of tightness in his throat. He estimates that the tightness started about 3 months ago. Patient also reports frequent belching, particularly when drinking water. He recently participated in an EGD. Per his report, this showed \"a definite indication of GERD\", but no Fuchs's esophagus or hiatal hernia. He reports that the biopsies taken during procedure showed no malignancy. The purpose of this study is to evaluate the physiology & function of patient's oropharyngeal swallow and rule out pharyngeal dysphagia as the cause of his symptoms. Patient also participated in an esophagram during this visit. Please refer to Radiologist's separately dictated report for details.     Patient presents as pleasant and cooperative during this evaluation.   An  was not applicable     Patient was given thin and pureed consistencies of barium.    Objective:    Dentition/Oral hygiene: Dentition is intact. Oral hygiene was good.    Oral motor function was not impaired.     Bolus prep and oral control were not impaired.     Anterior-Posterior transit was not impaired.    Premature spill beyond the base of the tongue into the valleculae did not occur with either consistency.    Tongue base retraction was not impaired.    Oral stasis did not occur with either consistency.    Aspiration did not occur with either consistency.    Laryngeal penetration did not occur with either consistency.    Swallow response was timely with both thin liquid and puree consistency. No pharyngeal pourover was observed.    Epiglottic movement was complete consistently across texture trials.    Pharyngeal stasis did not occur with either texture.    Pharyngeal constriction was not impaired.    Hyolaryngeal elevation was not impaired. Hyolaryngeal excursion was mildly reduced.    Cricopharyngeal function " was not impaired, though patient was noted to have a mildly prominent cricopharyngeus muscle. Cervical esophageal function was not impaired.    16 dysphagia minutes    Iwona KRISSY Galicia, CCC-SLP

## 2021-06-09 NOTE — PROGRESS NOTES
SUBJECTIVE:    This is a 71-year-old male who presents to the clinic for complete physical examination. His medical is notable for prostate cancer, hyperlipidemia, GERD with a known hiatal hernia, as well as colon polyps. Regarding his prostate cancer he did have a previous radical prostatectomy. He has had a penile prosthesis for erectile dysfunction. More recently he had surgery for Dupuytren's contracture with some improvement in his right hand.  Is evaluated by orthopedics and did have a cortisone injection in his right hand.  He will have another visit with orthopedics tomorrow for a repeat injection.  He has had bloating and gas and an evaluation by gastroenterology. He continues to take Protonix which was decreased to once a day. His is up to date on his colonoscopy which he had in June of 2013. He had a tubular adenoma.  He has continued to take atorvastatin for hyperlipidemia and also takes Protonix as noted.  He figures that certain foods can contribute to his gassiness and bloating and so that has been more tolerable.  Recently, he has developed some pain in the right groin area.  He states that he first noticed this during sexual intercourse.  He cannot think of any specific injury but does note that he will have a sense of pain or pressure in the right groin area.  He does have a history of a prosthesis which was implanted in that area.  He denies nausea or vomiting.  He has not had bowel changes.  Also, he has had a small cystic lesion involving the left fourth toe.      Patient Active Problem List   Diagnosis     Male Erectile Disorder     Cough     Overweight     Compression Fracture Of Thoracic Vertebral Body     Decrease In Height     Seborrheic Keratosis     Lentigo     Rotator Cuff Tendon Tear     Eustachian Tube Dysfunction     Hemangioma Of The Skin     Fatigue     Blood Pressure Isolated Elevated     Cellulitis     Prostate Cancer     Skin Neoplasm Of Uncertain Behavior     Hyperlipidemia      Esophageal Reflux     Dupuytren's Contracture     Benign Adenomatous Polyp Of The Large Intestine       Current Outpatient Prescriptions on File Prior to Visit   Medication Sig     atorvastatin (LIPITOR) 20 MG tablet TAKE ONE TABLET BY MOUTH ONCE DAILY NEED  TO  BE  SEEN  FOR  FURTHER  REFILLS     MULTIVIT &MINERALS/FERROUS FUM (MULTI VITAMIN ORAL) Take by mouth.     pantoprazole (PROTONIX) 40 MG tablet TAKE ONE TABLET BY MOUTH EVERY DAY     [DISCONTINUED] B INFANTIS/B ANI/B CONRAD/B BIFID (PROBIOTIC 4X ORAL) Take by mouth daily.     No current facility-administered medications on file prior to visit.        No Known Allergies         A 12 point comprehensive review of systems was negative except as noted.      OBJECTIVE:     Vitals:    04/07/17 1041   BP: 120/78   Pulse: 88   Resp: 16   Temp: 98.1  F (36.7  C)       General: Alert, not acutely distressed   Head:  Atraumatic, normocephalic  Ears:  TMs normal pearly-gray  Eyes:  PERRL, extraocular movements are intact  Nose:  Septum midline  Throat:  Oral mucosa moist, oropharynx clear  Neck:  Supple without adenopathy or thyromegaly   Cardiovascular: S1-S2 with regular rate and without murmur, rub, or gallop   Lungs:  Clear to auscultation bilaterally   Abdomen:  Soft, non tender, nondistended  There is a palpable reservoir in the right inguinal area but no obvious palpable hernia  Urinary: Inguinal hernias detected.  He does have a penile prosthesis  Rectal: Normal sphincter tone, absent prostate, no palpable masses  Extremities: Without cyanosis or edema   He has Dupuytren's contractures involving both palms  There is a mucoid cystic structure involving a toe of the left foot  Neuro:  CN II-XII appear intact        Laboratory Results:    Results for orders placed or performed in visit on 03/31/16   Lipid Cascade   Result Value Ref Range    Cholesterol 214 (H) <=199 mg/dL    Triglycerides 95 <=149 mg/dL    HDL Cholesterol 55 >=40 mg/dL    LDL Calculated 140 (H) <=129  mg/dL    Patient Fasting > 8hrs? Yes    Hepatic Profile   Result Value Ref Range    Bilirubin, Total 0.9 0.0 - 1.0 mg/dL    Bilirubin, Direct 0.3 <=0.5 mg/dL    Protein, Total 7.4 6.0 - 8.0 g/dL    Albumin 4.4 3.5 - 5.0 g/dL    Alkaline Phosphatase 72 45 - 120 U/L    AST 29 0 - 40 U/L    ALT 28 0 - 45 U/L   Basic Metabolic Panel   Result Value Ref Range    Sodium 138 136 - 145 mmol/L    Potassium 4.7 3.5 - 5.0 mmol/L    Chloride 101 98 - 107 mmol/L    CO2 25 22 - 31 mmol/L    Anion Gap, Calculation 12 5 - 18 mmol/L    Glucose 91 70 - 125 mg/dL    Calcium 9.7 8.5 - 10.5 mg/dL    BUN 14 8 - 22 mg/dL    Creatinine 1.05 0.70 - 1.30 mg/dL    GFR MDRD Af Amer >60 >60 mL/min/1.73m2    GFR MDRD Non Af Amer >60 >60 mL/min/1.73m2   PSA (Prostatic-Specific Antigen), Diagnostic   Result Value Ref Range    PSA <0.1 0.0 - 4.5 ng/mL   HM2(CBC w/o Differential)   Result Value Ref Range    WBC 5.6 4.0 - 11.0 thou/uL    RBC 4.99 4.40 - 6.20 mill/uL    Hemoglobin 15.4 14.0 - 18.0 g/dL    Hematocrit 45.5 40.0 - 54.0 %    MCV 91 80 - 100 fL    MCH 30.9 27.0 - 34.0 pg    MCHC 33.9 32.0 - 36.0 g/dL    RDW 11.2 11.0 - 14.5 %    Platelets 186 140 - 440 thou/uL    MPV 6.9 (L) 7.0 - 10.0 fL         ASSESSMENT AND PLAN:    1. Routine general medical examination at a health care facility    Recommend remaining active  Vaccines are up-to-date  PHQ-2 score 0  There are no fall risk concerns    2. Hyperlipidemia    Continue atorvastatin  - Lipid Cascade  - Basic Metabolic Panel  - Hepatic Profile    3. Benign Adenomatous Polyp Of The Large Intestine    His most recent colonoscopy was in June 2013 and this showed a tubular adenoma.  He is due in June, 2018    4. Prostate Cancer  Status post radical prostatectomy with penile prosthesis    Check of diagnostic PSA  - PSA (Prostatic-Specific Antigen), Diagnostic    5. Groin pain, associated with right lower abdominal pain  This appears to be a muscular strain    Recommend symptomatic treatment.  There  is no obvious palpable hernia  Consider further evaluation with a CT scan of the lower abdomen if needed    6.  Dupuytren's contracture, bilaterally  7.  Digital myxoid cyst, toe      He may follow-up with orthopedics for a digital myxoid cyst and for Dupuytren's contracture      Scar Ambrosio MD      This note has been dictated and transcribed using voice recognition software.  Any grammatical or contextual distortions are unintentional and inherent to the software.

## 2021-06-09 NOTE — TELEPHONE ENCOUNTER
Refill Approved    Rx renewed per Medication Renewal Policy. Medication was last renewed on 7/1/19.    Kae Jones, [facility] [place] Connection Triage/Med Refill 6/19/2020     Requested Prescriptions   Pending Prescriptions Disp Refills     atorvastatin (LIPITOR) 20 MG tablet [Pharmacy Med Name: Atorvastatin Calcium 20 MG Oral Tablet] 90 tablet 0     Sig: Take 1 tablet by mouth once daily       Statins Refill Protocol (Hmg CoA Reductase Inhibitors) Passed - 6/18/2020 10:54 AM        Passed - PCP or prescribing provider visit in past 12 months      Last office visit with prescriber/PCP: 5/17/2018 Scar Ambrosio MD OR same dept: Visit date not found OR same specialty: 6/25/2018 Madhav Myles MD  Last physical: 3/9/2020 Last MTM visit: Visit date not found   Next visit within 3 mo: Visit date not found  Next physical within 3 mo: Visit date not found  Prescriber OR PCP: Scar Ambrosio MD  Last diagnosis associated with med order: 1. Hyperlipidemia  - atorvastatin (LIPITOR) 20 MG tablet [Pharmacy Med Name: Atorvastatin Calcium 20 MG Oral Tablet]; TAKE 1 TABLET BY MOUTH ONCE DAILY  Dispense: 90 tablet; Refill: 0    If protocol passes may refill for 12 months if within 3 months of last provider visit (or a total of 15 months).

## 2021-06-12 NOTE — PROGRESS NOTES
Assessment/ Plan     1. Low back pain  Likely secondary to degenerative disc disease.  Consider possible lumbar radiculopathy    He has had pulmonary x-rays and treatment by Dr. Fuchs, chiropractor  Recommend symptomatic treatment including application of heat or cool packs  Continue to follow-up with his chiropractor  He also will follow up with physical therapy  Reviewed low back stretches and exercises.  He was given a handout discussing these in greater detail  He may take anti-inflammatory medication as tolerated. He may take Tylenol as well  If not improving he will need an MRI for further evaluation and possible referral to spine care  Reviewed concerning signs and symptoms recommend immediate follow-up if needed including symptoms of focal weakness, radicular symptoms extending to his feet, or bladder or bowel dysfunction    25 minutes were spent with the patient and greater than 50% of the time was spent in face to face counseling and coordination of care        Subjective:       Adebayo Burgess is a 72 y.o. male who presents to the clinic as he has had a history of low back pain.  This has progressed over the past 3 weeks.  He notes that the pain can be quite severe and limiting at times.  He has followed up with Dr. Fuchs, his chiropractor for treatment and x-rays were apparently done.  Given the severity of symptoms he presents here.  He tried over-the-counter medications without significant improvement.  His movement can be difficult.  At this point he does not have sniffing and symptoms rating down his legs.  He denies focal weakness or bowel or bladder dysfunction.  He cannot think of any specific injury.    The following portions of the patient's history were reviewed and updated as appropriate: allergies, current medications, past family history, past medical history, past social history, past surgical history and problem list.    Review of Systems   A 12 point comprehensive review of systems was  "negative except as noted.      Current Outpatient Prescriptions   Medication Sig Dispense Refill     atorvastatin (LIPITOR) 20 MG tablet Take 1 tablet (20 mg total) by mouth daily. 90 tablet 3     MULTIVIT &MINERALS/FERROUS FUM (MULTI VITAMIN ORAL) Take by mouth.       pantoprazole (PROTONIX) 40 MG tablet TAKE ONE TABLET BY MOUTH ONCE DAILY 90 tablet 3     No current facility-administered medications for this visit.        Objective:      /80  Pulse 84  Temp 98.3  F (36.8  C) (Oral)   Resp 16  Ht 5' 6.5\" (1.689 m)  Wt 167 lb (75.8 kg)  BMI 26.55 kg/m2      General appearance: alert, appears stated age and cooperative  Head: Normocephalic, without obvious abnormality, atraumatic  Eyes: conjunctivae/corneas clear. PERRL, EOM's intact.   Back: There is no tenderness over the thoracic or lumbar spine  There is some paraspinous muscle tenderness lateral to the lumbar spine and also discomfort in the sacroiliac area  Straight leg raise is generally unremarkable this point  Extremities: extremities normal, atraumatic, no cyanosis or edema  Pulses: 2+ and symmetric  Neurologic: Alert and oriented X 3, normal strength and tone. Normal coordination and gait         No results found for this or any previous visit (from the past 168 hour(s)).       This note has been dictated using voice recognition software. Any grammatical or context distortions are unintentional and inherent to the software  "

## 2021-06-14 NOTE — TELEPHONE ENCOUNTER
New Appointment Needed  What is the reason for the visit:    Pre-Op Appt Request  When is the surgery? :  1/21/21  Where is the surgery?:   [facility]  Who is the surgeon? :  Dr.Robert Abreu   What type of surgery is being done?: Wrist surgery   Provider Preference: PCP only  How soon do you need to be seen?: asap  Waitlist offered?: No  Okay to leave a detailed message:  Yes

## 2021-06-14 NOTE — PROGRESS NOTES
"[facility]  [address] [facility] [address]  Dept: [phone]  Dept Fax: [phone]  Primary Provider: João Garvin MD  Pre-op Performing Provider: JOÃO GARVIN    PREOPERATIVE EVALUATION:  Today's date: 1/18/2021    Adebayo Burgess is a 75 y.o. male who presents for a preoperative evaluation.    Surgical Information:  Surgery/Procedure: Right wrist surgery  Surgery Location: [facility]  Surgeon: Dr. Abreu  Surgery Date: 1/21/2021  Time of Surgery: 11AM  Where patient plans to recover: At home with family  Fax number for surgical facility: [phone]    Type of Anesthesia Anticipated: To be determined. May be General    Subjective     HPI related to upcoming procedure:     This is a pleasant 75-year-old male who presents to clinic for preoperative evaluation.  He has a longstanding history of right wrist pain and has been assessed to have \"slack arthritis\".  He may have had an old injury with some ligamentous laxity.  He has had significantly limited range of motion and has not been able to perform basic functions with his right hand and wrist.  As a result, he is a candidate for surgery which may include a fusion.  He often has a throbbing discomfort in his wrist.    Additionally, he has followed up with orthopedics for treatment of Dupuytren's contracture involving both hands.    Medical history is otherwise notable for prostate cancer with a previous radical prostatectomy.  He would like to get a PSA checked today.  He has a history of hyperlipidemia currently treated with atorvastatin.      Medical history is otherwise notable for gastroesophageal reflux disease.  He has a history of a hiatal hernia in controlling takes troubles on a consistent basis.    He would like to consider laboratory testing as he is fasting today.     His medical history has not changed recently.  He has not had a recent fever or chills.  Denies chest pain or " palpitations.  He has no known history of coronary artery disease.  He has tolerated anesthesia previously.    Preop Questions 1/18/2021   Have you ever had a heart attack or stroke? No   Have you ever had surgery on your heart or blood vessels, such as a stent placement, a coronary artery bypass, or surgery on an artery in your head, neck, heart, or legs? No   Do you have chest pain with activity? No   Do you have a history of  heart failure? No   Do you currently have a cold, bronchitis or symptoms of other infection? No   Do you have a cough, shortness of breath, or wheezing? No   Do you or anyone in your family have previous history of blood clots? No   Do you or does anyone in your family have a serious bleeding problem such as prolonged bleeding following surgeries or cuts? No   Have you ever had problems with anemia or been told to take iron pills? No   Have you had any abnormal blood loss such as black, tarry or bloody stools? No   Have you ever had a blood transfusion? YES -    Have you ever had a transfusion reaction? No   Are you willing to have a blood transfusion if it is medically needed before, during, or after your surgery? Yes   Have you or any of your relatives ever had problems with anesthesia? No   Do you have sleep apnea, excessive snoring or daytime drowsiness? No   Do you have any artifical heart valves or other implanted medical devices like a pacemaker, defibrillator, or continuous glucose monitor? No   Do you have artificial joints? No   Are you allergic to latex? No     Health Care Directive:  Patient has a Health Care Directive on file.     Preoperative Review of :    reviewed - no record of controlled substances prescribed.    See problem list for active medical problems.  Problems all longstanding and stable, except as noted/documented.  See ROS for pertinent symptoms related to these conditions.      Review of Systems  Constitutional, neuro, ENT, endocrine, pulmonary, cardiac,  "gastrointestinal, genitourinary, musculoskeletal, integument and psychiatric systems are negative, except as otherwise noted.      Patient Active Problem List    Diagnosis Date Noted     Esophagitis 05/12/2020     Nephrolithiasis 09/15/2017     Male Erectile Disorder      Overweight      Compression Fracture Of Thoracic Vertebral Body      Decrease In Height      Seborrheic Keratosis      Lentigo      Rotator Cuff Tendon Tear      Eustachian Tube Dysfunction      Hemangioma Of The Skin      History of prostate cancer      Skin Neoplasm Of Uncertain Behavior      Hyperlipidemia      Esophageal Reflux      Dupuytren's Contracture      Benign Adenomatous Polyp Of The Large Intestine      No past medical history on file.  Past Surgical History:   Procedure Laterality Date     CT REMV PROSTATE,SUPRAPUBIC,SUBTOTAL      Description: Prostatectomy, Suprapubic;  Recorded: 10/18/2010;     Right hand surgery for Dupuytren's contracture       Current Outpatient Medications   Medication Sig Dispense Refill     atorvastatin (LIPITOR) 20 MG tablet Take 1 tablet by mouth once daily 90 tablet 2     MULTIVIT &MINERALS/FERROUS FUM (MULTI VITAMIN ORAL) Take by mouth.       pantoprazole (PROTONIX) 40 MG tablet TAKE 1 TABLET BY MOUTH ONCE DAILY 90 tablet 1     No current facility-administered medications for this visit.        No Known Allergies    Social History     Tobacco Use     Smoking status: Never Smoker     Smokeless tobacco: Never Used   Substance Use Topics     Alcohol use: Not on file      History reviewed. No pertinent family history.  Social History     Substance and Sexual Activity   Drug Use Not on file        Objective     /81   Pulse 74   Temp 97.7  F (36.5  C) (Oral)   Ht 5' 6.5\" (1.689 m)   Wt 166 lb (75.3 kg)   BMI 26.39 kg/m    Physical Exam    GENERAL APPEARANCE: healthy, alert and no distress     EYES: EOMI,  PERRL     HENT: ear canals and TM's normal and nose and mouth without ulcers or lesions     " NECK: no adenopathy, no asymmetry, masses, or scars and thyroid normal to palpation     RESP: lungs clear to auscultation - no rales, rhonchi or wheezes     CV: regular rates and rhythm, normal S1 S2, no S3 or S4 and no murmur, click or rub     ABDOMEN:  soft, nontender, no HSM or masses and bowel sounds normal     MS: extremities normal- no gross deformities noted, no evidence of inflammation in joints, FROM in all extremities.     SKIN: no suspicious lesions or rashes     NEURO: Normal strength and tone, sensory exam grossly normal, mentation intact and speech normal     PSYCH: mentation appears normal. and affect normal/bright     LYMPHATICS: No cervical adenopathy    Recent Labs   Lab Test 02/26/20  0904      K 4.1   CREATININE 1.18        PRE-OP Diagnostics:   Labs pending at this time. Results will be reviewed when available.  No EKG required, no history of coronary heart disease, significant arrhythmia, peripheral arterial disease or other structural heart disease.    REVISED CARDIAC RISK INDEX (RCRI)   The patient has the following serious cardiovascular risks for perioperative complications:   - No serious cardiac risks = 0 points    RCRI INTERPRETATION: 0 points: Class I (very low risk - 0.4% complication rate)         Assessment & Plan      The proposed surgical procedure is considered LOW risk.    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    Patient is approved for surgery  Recommend that he hold NSAIDs and OTC supplements prior to surgery  Follow-up as recommended by orthopedics  - 2(CBC w/o Differential)    Hyperlipidemia, unspecified hyperlipidemia type    Continue atorvastatin  Check a lipid cascade and hepatic profile  - Basic Metabolic Panel  - Hepatic Profile  - Lipid Cascade    Screening for prostate cancer  History of prostate cancer    Check a diagnostic PSA  - PSA (Prostatic-Specific Antigen),  Diagnostic      Gastroesophageal reflux disease without esophagitis    Continue pantoprazole as needed  Patient is aware of dietary lifestyle changes    Encounter for hepatitis C screening test for low risk patient    Check a hepatitis C test  - Hepatitis C Antibody (Anti-HCV)       Risks and Recommendations:  The patient has the following additional risks and recommendations for perioperative complications:   - No identified additional risk factors other than previously addressed    Medication Instructions:  Patient is to take all scheduled medications on the day of surgery    RECOMMENDATION:  APPROVAL GIVEN to proceed with proposed procedure, without further diagnostic evaluation.    Signed Electronically by: Scar Ambrosio MD    Copy of this evaluation report is provided to requesting physician.    [facility] Preop Guidelines    Revised Cardiac Risk Index

## 2021-06-14 NOTE — PATIENT INSTRUCTIONS - HE
We will check your blood tests as discussed  I recommend holding your over the counter multivitamin prior to your procedure  Follow-up as recommended by Dr. Abreu

## 2021-06-16 PROBLEM — N20.0 NEPHROLITHIASIS: Status: ACTIVE | Noted: 2017-09-15

## 2021-06-16 PROBLEM — K20.90 ESOPHAGITIS: Status: ACTIVE | Noted: 2020-05-12

## 2021-06-17 NOTE — TELEPHONE ENCOUNTER
Refill Approved    Rx renewed per Medication Renewal Policy. Medication was last renewed on 6/19/20.    Ady Tanner, [facility] Connection Triage/Med Refill 4/30/2021     Requested Prescriptions   Pending Prescriptions Disp Refills     atorvastatin (LIPITOR) 20 MG tablet [Pharmacy Med Name: Atorvastatin Calcium 20 MG Oral Tablet] 90 tablet 0     Sig: Take 1 tablet by mouth once daily       Statins Refill Protocol (Hmg CoA Reductase Inhibitors) Passed - 4/29/2021  7:48 AM        Passed - PCP or prescribing provider visit in past 12 months      Last office visit with prescriber/PCP: 5/17/2018 Scar Ambrosio MD OR same dept: Visit date not found OR same specialty: 6/25/2018 Madhav Myles MD  Last physical: 1/18/2021 Last MTM visit: Visit date not found   Next visit within 3 mo: Visit date not found  Next physical within 3 mo: Visit date not found  Prescriber OR PCP: Scar Ambrosio MD  Last diagnosis associated with med order: 1. Hyperlipidemia  - atorvastatin (LIPITOR) 20 MG tablet [Pharmacy Med Name: Atorvastatin Calcium 20 MG Oral Tablet]; Take 1 tablet by mouth once daily  Dispense: 90 tablet; Refill: 0    If protocol passes may refill for 12 months if within 3 months of last provider visit (or a total of 15 months).

## 2021-06-17 NOTE — PROGRESS NOTES
Assessment and Plan:       1. Medicare annual wellness visit, subsequent    Mini cog score is 5/5  PHQ-2 score is 0  Reviewed annual wellness visit health risk assessment form  Reviewed falls risk  Recommend that he consider the Shingrix vaccine when available    2. Screen for colon cancer  3. Benign neoplasm of colon    His most recent colonoscopy was June 2013 and showed a tubular adenoma.  He is due for a colonoscopy  - Ambulatory referral for Colonoscopy      4. Hyperlipidemia    Continue atorvastatin  Check a lipid cascade and hepatic profile  - Basic Metabolic Panel  - Hepatic Profile  - HM2(CBC w/o Differential)  - Lipid Cascade    5. Prostate Cancer  Status post prostatectomy    Penile prosthetic is functional per patient report  Check a PSA  - PSA (Prostatic-Specific Antigen), Diagnostic    6. Esophageal reflux    Continue Protonix    7. Nephrolithiasis    Recommend plenty of fluids  Follow-up as needed      The patient's current medical problems were reviewed.    I have had an Advance Directives discussion with the patient.  The following health maintenance schedule was reviewed with the patient and provided in printed form in the after visit summary:   Health Maintenance   Topic Date Due     FALL RISK ASSESSMENT  06/02/2012     COLONOSCOPY  06/13/2017     ADVANCE DIRECTIVES DISCUSSED WITH PATIENT  02/04/2019     TD 18+ HE  11/11/2023     PNEUMOCOCCAL POLYSACCHARIDE VACCINE AGE 65 AND OVER  Completed     INFLUENZA VACCINE RULE BASED  Completed     PNEUMOCOCCAL CONJUGATE VACCINE FOR ADULTS (PCV13 OR PREVNAR)  Completed     ZOSTER VACCINE  Completed        Subjective:   Chief Complaint: Adebayo Burgess is an 72 y.o. male here for an Annual Wellness visit.   HPI: This is a pleasant 72-year-old male who presents to the clinic for an annual wellness visit.  His medical history is notable for prostate cancer, hyperlipidemia, gastroesophageal reflux disease with a known hiatal hernia, as well as benign colon  polyps.  Regarding his prostate cancer he has had a previous radical prostatectomy.  He has had a penile prosthesis for erectile dysfunction.  In the past he has had Dupuytren's contracture with surgical repair by orthopedics.    He follows up today stating that there have been no significant changes in his health history.  He rates his health as good.  He exercises 3-5 times per week.  He does drink occasional alcohol.  He has not had recent falls and denies any need for assistance with activities of daily living.    Review of systems is notable for occasional vague lightheadedness.  He may note this after working for period of time.  He denies a sense that the room is spinning.  He has not had associated chest pain, palpitations, orthopnea, or paroxysmal nocturnal dyspnea.  He has not had bowel changes or recent urinary concerns.  He denies any concerns with his mood.    His most recent colonoscopy was in June 2013.  This showed a tubular adenoma and he therefore is due to have that rechecked this year.    He takes Protonix given a history of reflux symptoms.  He takes atorvastatin as well.  He has tolerated his medications without difficulty.      Review of Systems:   Please see above.  The rest of the review of systems are negative for all systems.    Patient Care Team:  Scar Ambrosio MD as PCP - General     Patient Active Problem List   Diagnosis     Male Erectile Disorder     Overweight     Compression Fracture Of Thoracic Vertebral Body     Decrease In Height     Seborrheic Keratosis     Lentigo     Rotator Cuff Tendon Tear     Eustachian Tube Dysfunction     Hemangioma Of The Skin     Prostate Cancer     Skin Neoplasm Of Uncertain Behavior     Hyperlipidemia     Esophageal Reflux     Dupuytren's Contracture     Benign Adenomatous Polyp Of The Large Intestine     Nephrolithiasis     No past medical history on file.   Past Surgical History:   Procedure Laterality Date     CRUZ HUGGINS  "PROSTATE,SUPRAPUBIC,SUBTOTAL      Description: Prostatectomy, Suprapubic;  Recorded: 10/18/2010;      No family history on file.   Social History     Social History     Marital status:      Spouse name: N/A     Number of children: N/A     Years of education: N/A     Occupational History     Not on file.     Social History Main Topics     Smoking status: Never Smoker     Smokeless tobacco: Never Used     Alcohol use Not on file     Drug use: Not on file     Sexual activity: Not on file     Other Topics Concern     Not on file     Social History Narrative      Current Outpatient Prescriptions   Medication Sig Dispense Refill     atorvastatin (LIPITOR) 20 MG tablet Take 1 tablet (20 mg total) by mouth daily. (Patient taking differently: Take 20 mg by mouth. Takes 20 mg  On Monday, Wednesday and Friday.) 90 tablet 3     MULTIVIT &MINERALS/FERROUS FUM (MULTI VITAMIN ORAL) Take by mouth.       pantoprazole (PROTONIX) 40 MG tablet TAKE ONE TABLET BY MOUTH ONCE DAILY 90 tablet 3     No current facility-administered medications for this visit.       Objective:   Vital Signs:   Visit Vitals     /70     Pulse 84     Temp 97.7  F (36.5  C) (Oral)     Resp 16     Ht 5' 6.5\" (1.689 m)     Wt 164 lb (74.4 kg)     BMI 26.07 kg/m2        VisionScreening:  No exam data present     PHYSICAL EXAM  General appearance: alert, appears stated age and cooperative  Head: Normocephalic, without obvious abnormality, atraumatic  Eyes: conjunctivae/corneas clear. PERRL, EOM's intact.   Ears: normal TM's and external ear canals both ears  Nose: Nares normal. Septum midline. Mucosa normal. No drainage or sinus tenderness.  Throat: lips, mucosa, and tongue normal; teeth and gums normal  Neck: no adenopathy, supple, symmetrical, trachea midline   Back: symmetric, no curvature. ROM normal. No CVA tenderness.  Lungs: clear to auscultation bilaterally  Heart: regular rate and rhythm, S1, S2 normal, no murmur, click, rub or gallop  Abdomen: " soft, non-tender; bowel sounds normal; no masses,  no organomegaly  Genitourinary: Penis is circumcised, no scrotal masses, no inguinal hernia  A penile prosthetic is evident  Rectal: Normal sphincter tone, prostate is absent on exam  No palpable masses are evident  Extremities: extremities normal, atraumatic, no cyanosis or edema  Skin: Skin color, texture, turgor normal. No rashes or lesions  Lymph nodes: Cervical nodes normal.  Neurologic: Alert and oriented X 3. Normal coordination and gait      Assessment Results 4/9/2018   Activities of Daily Living No help needed   Instrumental Activities of Daily Living No help needed   Mini Cog Total Score 5   Some recent data might be hidden     A Mini-Cog score of 0-2 suggests the possibility of dementia, score of 3-5 suggests no dementia    Identified Health Risks:     Patient's advanced directive was discussed and I am comfortable with the patient's wishes.

## 2021-06-18 NOTE — PROGRESS NOTES
"[facility] Clinic Note    Patient Name: Adebayo Burgess  Patient Age: 73 y.o.  YOB: 1945  MRN: [id number]    Date of visit: 6/14/2018    Patient Active Problem List   Diagnosis     Male Erectile Disorder     Overweight     Compression Fracture Of Thoracic Vertebral Body     Decrease In Height     Seborrheic Keratosis     Lentigo     Rotator Cuff Tendon Tear     Eustachian Tube Dysfunction     Hemangioma Of The Skin     Prostate Cancer     Skin Neoplasm Of Uncertain Behavior     Hyperlipidemia     Esophageal Reflux     Dupuytren's Contracture     Benign Adenomatous Polyp Of The Large Intestine     Nephrolithiasis     Social History     Social History Narrative     Outpatient Encounter Prescriptions as of 6/14/2018   Medication Sig Dispense Refill     atorvastatin (LIPITOR) 20 MG tablet Take 1 tablet (20 mg total) by mouth daily. 90 tablet 3     MULTIVIT &MINERALS/FERROUS FUM (MULTI VITAMIN ORAL) Take by mouth.       pantoprazole (PROTONIX) 40 MG tablet TAKE ONE TABLET BY MOUTH ONCE DAILY 90 tablet 3     No facility-administered encounter medications on file as of 6/14/2018.        Chief Complaint:   Chief Complaint   Patient presents with     Laceration     Left thumb       /70  Pulse 60  Temp 97.9  F (36.6  C)  Resp 12  Ht 5' 6.5\" (1.689 m)  Wt 160 lb 11.5 oz (72.9 kg)  BMI 25.55 kg/m2  HPI:   When occurred:3h pta  Location:left thumb palmar  Mechanism:metal  Numbness, weakness: no  Blood loss: minimal   Clean: Yes  Foreign body sensation: no    Last tetanus:<5y      ROS: Pertinent ros findings in hpi, all other systems negative.    Objective/Physical Exam:     /70  Pulse 60  Temp 97.9  F (36.6  C)  Resp 12  Ht 5' 6.5\" (1.689 m)  Wt 160 lb 11.5 oz (72.9 kg)  BMI 25.55 kg/m2    Gen: NAD, appears age  Skin: warm, dry  HENT: normocephalic atraumatic, MMM  Eyes: non-icteric, no proptosis  CV: NRRR no m/r/g, no peripheral edema  Resp: CTAB no w/r/r, normal respiratory effort  Abd: " non-distended, soft  Hematologic: No petechiae or purpura    Neuro: no dysarthria or gross asymmetry  Psych: Cooperative, full affect    Laceration location:left thumb palmar over ip joint  Length: 2.5cm  Shape: curvilinear flap  Depth: into dermis   Approximates well: Yes  Clean: Yes  Foreign body visualized: no    < 2 sec cap refill distally  fine touch sensation intact distally  motor: intact distally   5/5 strength. 2 pt. Discrimination 2-3mm bilat.    Laceration repair:  Risks, benefits, alternatives discussed including bleeding, infection, motor/sensory disability, unsatisfactory cosmetic result. Patient expressed understanding. Verbal consent obtained.    Anesthesia: 2.5 cc 1% lidocaine without epi, good anesthesia obtained  Irrigation:faucet copious  Wound examined under bloodless field: yes, no fb or cut tendon noticed. clean  Sterile field obtained, iodine solution used to cleanse surrounding area x3 swabs.   Suture: 5.0 prolene  Stitch: interrupted, single layer  Number:3  Satisfactory approximation: Yes  Patient tolerated well.  EBL: <10cc  during time in office to finish  NV intact after  Antibiotic ointment applied to area with gauze over this.    Assessment/Plan:  No results found for this or any previous visit (from the past 24 hour(s)).  Encounter Diagnoses   Name Primary?     Laceration of left thumb without foreign body without damage to nail, initial encounter Yes       No orders of the defined types were placed in this encounter.      We did discuss risk that flap would lose vascularity. If begins to appear pale or black rtc.  We also discussed loosening spint if increasing pain of thumb or pallor or cyanosis.  Patient agreed.    Patient Instructions   Apply antibiotic ointment daily, if begins to appear macerated, allow to dry overnight and begin re-applying afterward. Change dressings 1-2x/d as needed. If any significant increase in drainage, swelling, redness, pain, fever, weakness, numbness  or other concerning symptom seek medical care immediately. If develop foreign body sensation, seek medical attention promptly. Patient is advised to wear sunscreen and avoid significant sun exposure for 6-12 months after wound has healed since sun exposure can cause hyperpigmentation.    Return in 7-10 days for re-evaluation.  Keep splinted.  Use nonstick gauze.  Keep splint loosely applied and don't use left thumb until re-evaluated.  Keep out of water.      Counseled patient regarding treatments, treatment options, risks and benefits and diagnosis.  The patient was interactive, attentive, verbalized understanding, and we discussed plan.     Madhav Myles MD

## 2021-06-18 NOTE — PROGRESS NOTES
"[facility] Clinic Note    Patient Name: Adebayo Burgess  Patient Age: 73 y.o.  YOB: 1945  MRN: [id number]    Date of visit: 6/25/2018    Patient Active Problem List   Diagnosis     Male Erectile Disorder     Overweight     Compression Fracture Of Thoracic Vertebral Body     Decrease In Height     Seborrheic Keratosis     Lentigo     Rotator Cuff Tendon Tear     Eustachian Tube Dysfunction     Hemangioma Of The Skin     Prostate Cancer     Skin Neoplasm Of Uncertain Behavior     Hyperlipidemia     Esophageal Reflux     Dupuytren's Contracture     Benign Adenomatous Polyp Of The Large Intestine     Nephrolithiasis     Social History     Social History Narrative     Outpatient Encounter Prescriptions as of 6/25/2018   Medication Sig Dispense Refill     atorvastatin (LIPITOR) 20 MG tablet Take 1 tablet (20 mg total) by mouth daily. 90 tablet 3     MULTIVIT &MINERALS/FERROUS FUM (MULTI VITAMIN ORAL) Take by mouth.       pantoprazole (PROTONIX) 40 MG tablet TAKE ONE TABLET BY MOUTH ONCE DAILY 90 tablet 3     No facility-administered encounter medications on file as of 6/25/2018.        Chief Complaint:   Chief Complaint   Patient presents with     Suture / Staple Removal       /70  Pulse 64  Temp 97.7  F (36.5  C) (Oral)   Resp 12  Ht 5' 6.5\" (1.689 m)  Wt 161 lb (73 kg)  BMI 25.6 kg/m2  HPI:   Adebayo is here for suture removal, he has been keeping a bandage on it and the splint and has had no purulent drainage, no pain.  It is healing well.    ROS: Pertinent ros findings in hpi, all other systems negative.    Objective/Physical Exam:     /70  Pulse 64  Temp 97.7  F (36.5  C) (Oral)   Resp 12  Ht 5' 6.5\" (1.689 m)  Wt 161 lb (73 kg)  BMI 25.6 kg/m2    Gen: NAD, appears age  Skin: warm, dry  HENT: normocephalic atraumatic, MMM  Eyes: non-icteric, no proptosis  CV: NRRR no m/r/g, no peripheral edema  Resp: CTAB no w/r/r, normal respiratory effort  Abd: non-distended, soft  Hematologic: No " petechiae or purpura  MSK: no muscle or joint swelling  Neuro: no dysarthria or gross asymmetry  Psych: Cooperative, full affect  Left thumb laceration very well approximated, no cyanosis or pallor or swelling or erythema.  I did remove the 3 sutures that were still in place.      Assessment/Plan:  No results found for this or any previous visit (from the past 24 hour(s)).  Encounter Diagnoses   Name Primary?     Encounter for removal of sutures Yes       No orders of the defined types were placed in this encounter.      Wound is healing very well, there is still somewhat of a lip on the side of the wound therefore, I recommend continuing to use antibiotic ointment as directed and continue to keep bandaged.  It should be ready to leave uncovered in 5-7 days.  If any worsening or concerns, I recommended that he return to the clinic.    There are no Patient Instructions on file for this visit.    Counseled patient regarding treatments, treatment options, risks and benefits and diagnosis.  The patient was interactive, attentive, verbalized understanding, and we discussed plan.     Madhav Myles MD

## 2021-06-18 NOTE — PROGRESS NOTES
Assessment/ Plan     1. Plugged feeling in ear, left  2. Eustachian tube dysfunction    Recommend conservative measures as this likely will resolve over time  Antibiotics are not warranted  He can consider use of pseudoephedrine in the short-term  Consider Flonase nasal spray  Discussed possible oral prednisone taper as an option in the future if not improving  Consider an antihistamine for possible allergies  Consider referral to ENT    3. Hyperlipidemia    Refill atorvastatin  - atorvastatin (LIPITOR) 20 MG tablet; Take 1 tablet (20 mg total) by mouth daily.  Dispense: 90 tablet; Refill: 3    Patient agrees to plan      Subjective:       Adebayo Burgess is a 72 y.o. male who presents to the clinic.  Over the past 1 month he has had a sense that his left ear is plugged.  Feels like there is fluid in it.  He can have crackling and popping.  This has worsened over the past 2-3 weeks.  He did have some nasal congestion recently.  He denies obvious allergies.  He says will clear his throat consistently.  He denies ear pain, fever, or significant cough.  Also, he has history of hyperlipidemia and needs a refill of his Lipitor.  His recent total cholesterol was 171 with an LDL of 97.  He recently presented for a complete physical examination.    The following portions of the patient's history were reviewed and updated as appropriate: allergies, current medications, past family history, past medical history, past social history, past surgical history and problem list.    Review of Systems   A 12 point comprehensive review of systems was negative except as noted.      Current Outpatient Prescriptions   Medication Sig Dispense Refill     atorvastatin (LIPITOR) 20 MG tablet Take 1 tablet (20 mg total) by mouth daily. 90 tablet 3     MULTIVIT &MINERALS/FERROUS FUM (MULTI VITAMIN ORAL) Take by mouth.       pantoprazole (PROTONIX) 40 MG tablet TAKE ONE TABLET BY MOUTH ONCE DAILY 90 tablet 3     No current facility-administered  "medications for this visit.        Objective:      /64  Pulse 96  Temp 98  F (36.7  C) (Oral)   Resp 16  Ht 5' 6.5\" (1.689 m)  Wt 161 lb (73 kg)  BMI 25.6 kg/m2      General appearance: alert, appears stated age and cooperative  Head: Normocephalic, without obvious abnormality, atraumatic  Eyes: conjunctivae/corneas clear. PERRL, EOM's intact.   Ears: normal TM's and external ear canals both ears  Nose: Nares normal. Septum midline. Mucosa normal  Throat: lips, mucosa, and tongue normal; teeth and gums normal  Lymph nodes: Cervical nodes normal.  Neurologic: Alert and oriented X 3. Normal coordination and gait         No results found for this or any previous visit (from the past 168 hour(s)).       This note has been dictated using voice recognition software. Any grammatical or context distortions are unintentional and inherent to the software  "

## 2021-06-23 NOTE — PROGRESS NOTES
Preoperative Exam    Scheduled Procedure: Right hand  Surgery Date:  1/28/19  Surgery Location: [facility], fax [phone]    Surgeon:  Dr. RAI Abreu    Assessment/Plan:     1. Preop general physical exam  2. Dupuytren's Contracture    Patient is approved for surgery  He is able to perform at least 4 METS of physical activity without difficulty  An ECG reveals normal sinus rhythm without acute changes.  This was personally reviewed and will be reviewed by cardiology  - Electrocardiogram Perform - Clinic    Check basic metabolic panel and hemogram with platelets  Patient will hold NSAIDs and OTC supplements 1 week prior to surgery  Follow-up as recommended by orthopedics      3. Gastroesophageal reflux disease without esophagitis    Continue Protonix  He has had an extensive evaluation by gastroenterology    4. Hyperlipidemia, unspecified hyperlipidemia type    Continue atorvastatin  Check a lipid cascade and hepatic profile    5.  Prostate cancer  Status post radical prostatectomy    Check a diagnostic PSA      Surgical Procedure Risk: Low (reported cardiac risk generally < 1%)  Have you had prior anesthesia?: Yes  Have you or any family members had a previous anesthesia reaction:  No  Do you or any family members have a history of a clotting or bleeding disorder?: No  Cardiac Risk Assessment: no increased risk for major cardiac complications    Patient approved for surgery with general or local anesthesia.    Please Note:    Functional Status: Independent  Patient plans to recover at home with family.     Subjective:      Adebayo Burgess is a 73 y.o. male who presents for a preoperative consultation.  He has a known history of Dupuytren's contracture that primarily affects his right hand.  He has had injections on at least two occasions with only short-term benefit.  Given his ongoing concerns he is a candidate for surgery and will proceed with orthopedics regarding  this.    His medical history is notable for prostate cancer with previous radical prostatectomy.  He has a history of hyperlipidemia and gastroesophageal reflux disease with a known hiatal hernia as well as benign colon polyps.  He has a penile prosthesis given erectile dysfunction.    Review of systems is generally unremarkable.  He continues to have excessive gassiness related to his hiatal hernia.  He will take Protonix and has followed up with gastroenterology previously.    Review of systems also notable for a fall approximately two weeks ago from a height of approximately 10 feet.  He expands neck and back pain also has had some right upper extremity pain.  He was evaluated by orthopedics.  He denies ongoing headaches.  He has had some low back pain treated by his chiropractor.    He otherwise feels well denies a recent respiratory infection, fever, chest pain, palpitations, or other concerns.  He is able to tolerate physical exertion without difficulty.      All other systems reviewed and are negative, other than those listed in the HPI.    Pertinent History  Do you have difficulty breathing or chest pain after walking up a flight of stairs: No  History of obstructive sleep apnea: No  Steroid use in the last 6 months: No  Frequent Aspirin/NSAID use: Yes: for back pain. Will stop  Prior Blood Transfusion: No  Prior Blood Transfusion Reaction: No  If for some reason prior to, during or after the procedure, if it is medically indicated, would you be willing to have a blood transfusion?:  There is no transfusion refusal.    Current Outpatient Medications   Medication Sig Dispense Refill     atorvastatin (LIPITOR) 20 MG tablet Take 1 tablet (20 mg total) by mouth daily. 90 tablet 3     MULTIVIT &MINERALS/FERROUS FUM (MULTI VITAMIN ORAL) Take by mouth.       pantoprazole (PROTONIX) 40 MG tablet TAKE ONE TABLET BY MOUTH ONCE DAILY 90 tablet 3     No current facility-administered medications for this visit.      "    No Known Allergies    Patient Active Problem List   Diagnosis     Male Erectile Disorder     Overweight     Compression Fracture Of Thoracic Vertebral Body     Decrease In Height     Seborrheic Keratosis     Lentigo     Rotator Cuff Tendon Tear     Eustachian Tube Dysfunction     Hemangioma Of The Skin     Prostate Cancer     Skin Neoplasm Of Uncertain Behavior     Hyperlipidemia     Esophageal Reflux     Dupuytren's Contracture     Benign Adenomatous Polyp Of The Large Intestine     Nephrolithiasis       No past medical history on file.    Past Surgical History:   Procedure Laterality Date     OK REMV PROSTATE,SUPRAPUBIC,SUBTOTAL      Description: Prostatectomy, Suprapubic;  Recorded: 10/18/2010;       Social History     Socioeconomic History     Marital status:      Spouse name: Not on file     Number of children: Not on file     Years of education: Not on file     Highest education level: Not on file   Social Needs     Financial resource strain: Not on file     Food insecurity - worry: Not on file     Food insecurity - inability: Not on file     Transportation needs - medical: Not on file     Transportation needs - non-medical: Not on file   Occupational History     Not on file   Tobacco Use     Smoking status: Never Smoker     Smokeless tobacco: Never Used   Substance and Sexual Activity     Alcohol use: Not on file     Drug use: Not on file     Sexual activity: Not on file   Other Topics Concern     Not on file   Social History Narrative     Not on file       Patient Care Team:  Scar Ambrosio MD as PCP - General          Objective:     Vitals:    01/17/19 0801   BP: 136/80   Pulse: 72   Resp: 20   Temp: 97.4  F (36.3  C)   Weight: 167 lb (75.8 kg)   Height: 5' 6.5\" (1.689 m)         Physical Exam:  Physical Exam     General appearance: alert, appears stated age and cooperative  Head: Normocephalic, without obvious abnormality, atraumatic  Eyes: conjunctivae/corneas clear. PERRL, EOM's " intact.   Ears: normal TM's and external ear canals both ears  Nose: Nares normal. Septum midline. Mucosa normal. No drainage or sinus tenderness.  Throat: lips, mucosa, and tongue normal; teeth and gums normal  Neck: no adenopathy, supple, symmetrical, trachea midline and thyroid not enlarged  Back: symmetric, no curvature. ROM normal. No CVA tenderness.  Lungs: clear to auscultation bilaterally  Heart: regular rate and rhythm, S1, S2 normal, no murmur, click, rub or gallop  Abdomen: soft, non-tender  Extremities: Palpable cord, palm of right hand  Skin: Skin color, texture, turgor normal. No rashes or lesions  Lymph nodes: Cervical nodes normal.  Neurologic: Alert and oriented X 3      There are no Patient Instructions on file for this visit.    EKG:  Results   Electrocardiogram Perform - Clinic (Order [id number])   Electrocardiogram Perform - Clinic   Order: [id number]   Status:  In process   Visible to patient:  No (Not Released) Next appt:  None Dx:  Preop general physical exam    Ref Range & Units 1/17/19 0812   SYSTOLIC BLOOD PRESSURE mmHg    DIASTOLIC BLOOD PRESSURE mmHg    VENTRICULAR RATE BPM 71    ATRIAL RATE BPM 71    P-R INTERVAL ms 166    QRS DURATION ms 106    Q-T INTERVAL ms 372    QTC CALCULATION (BEZET) ms 404    P Axis degrees 72    R AXIS degrees -23    T AXIS degrees 12    MUSE DIAGNOSIS  Normal sinus rhythm   Normal ECG   No previous ECGs available       Resulting Agency  [facility]               Labs:  Recent Results (from the past 48 hour(s))   Electrocardiogram Perform - Clinic    Collection Time: 01/17/19  8:12 AM   Result Value Ref Range    SYSTOLIC BLOOD PRESSURE  mmHg    DIASTOLIC BLOOD PRESSURE  mmHg    VENTRICULAR RATE 71 BPM    ATRIAL RATE 71 BPM    P-R INTERVAL 166 ms    QRS DURATION 106 ms    Q-T INTERVAL 372 ms    QTC CALCULATION (BEZET) 404 ms    P Axis 72 degrees    R AXIS -23 degrees    T AXIS 12 degrees    MUSE DIAGNOSIS       Normal sinus rhythm  Normal ECG  No previous ECGs available           Immunization History   Administered Date(s) Administered     Influenza high dose, seasonal 11/11/2013, 12/04/2014, 11/13/2015, 11/21/2016, 11/06/2017, 10/29/2018     Influenza, seasonal,quad inj 6-35 mos 10/18/2010, 11/11/2011, 11/30/2012     Pneumo Conj 13-V (2010&after) 03/31/2016     Pneumo Polysac 23-V 02/09/2012     Tdap 10/18/2010, 11/11/2013     ZOSTER, LIVE 03/22/2013           Electronically signed by Scar Ambrosio MD 01/17/19 8:04 AM

## 2021-07-27 ENCOUNTER — TRANSFERRED RECORDS (OUTPATIENT)
Dept: HEALTH INFORMATION MANAGEMENT | Facility: CLINIC | Age: 76
End: 2021-07-27

## 2021-08-03 ENCOUNTER — TRANSFERRED RECORDS (OUTPATIENT)
Dept: HEALTH INFORMATION MANAGEMENT | Facility: CLINIC | Age: 76
End: 2021-08-03

## 2021-09-18 ENCOUNTER — HEALTH MAINTENANCE LETTER (OUTPATIENT)
Age: 76
End: 2021-09-18

## 2021-09-23 ENCOUNTER — IMMUNIZATION (OUTPATIENT)
Dept: FAMILY MEDICINE | Facility: CLINIC | Age: 76
End: 2021-09-23
Payer: COMMERCIAL

## 2021-09-23 PROCEDURE — G0008 ADMIN INFLUENZA VIRUS VAC: HCPCS

## 2021-09-23 PROCEDURE — 90662 IIV NO PRSV INCREASED AG IM: CPT

## 2021-11-02 ENCOUNTER — TRANSFERRED RECORDS (OUTPATIENT)
Dept: HEALTH INFORMATION MANAGEMENT | Facility: CLINIC | Age: 76
End: 2021-11-02
Payer: COMMERCIAL

## 2021-11-22 ENCOUNTER — LAB (OUTPATIENT)
Dept: LAB | Facility: CLINIC | Age: 76
End: 2021-11-22
Payer: COMMERCIAL

## 2021-11-22 DIAGNOSIS — Z12.5 ENCOUNTER FOR SCREENING FOR MALIGNANT NEOPLASM OF PROSTATE: ICD-10-CM

## 2021-11-22 DIAGNOSIS — Z85.46 HISTORY OF PROSTATE CANCER: ICD-10-CM

## 2021-11-22 DIAGNOSIS — E78.5 HYPERLIPIDEMIA, UNSPECIFIED HYPERLIPIDEMIA TYPE: ICD-10-CM

## 2021-11-22 DIAGNOSIS — K21.9 GASTROESOPHAGEAL REFLUX DISEASE WITHOUT ESOPHAGITIS: ICD-10-CM

## 2021-11-22 LAB
ALBUMIN SERPL-MCNC: 4.3 G/DL (ref 3.5–5)
ALP SERPL-CCNC: 81 U/L (ref 45–120)
ALT SERPL W P-5'-P-CCNC: 33 U/L (ref 0–45)
ANION GAP SERPL CALCULATED.3IONS-SCNC: 11 MMOL/L (ref 5–18)
AST SERPL W P-5'-P-CCNC: 31 U/L (ref 0–40)
BILIRUB DIRECT SERPL-MCNC: 0.3 MG/DL
BILIRUB SERPL-MCNC: 1 MG/DL (ref 0–1)
BUN SERPL-MCNC: 17 MG/DL (ref 8–28)
CALCIUM SERPL-MCNC: 9.6 MG/DL (ref 8.5–10.5)
CHLORIDE BLD-SCNC: 104 MMOL/L (ref 98–107)
CHOLEST SERPL-MCNC: 215 MG/DL
CO2 SERPL-SCNC: 23 MMOL/L (ref 22–31)
CREAT SERPL-MCNC: 1.03 MG/DL (ref 0.7–1.3)
ERYTHROCYTE [DISTWIDTH] IN BLOOD BY AUTOMATED COUNT: 12 % (ref 10–15)
FASTING STATUS PATIENT QL REPORTED: YES
GFR SERPL CREATININE-BSD FRML MDRD: 71 ML/MIN/1.73M2
GLUCOSE BLD-MCNC: 95 MG/DL (ref 70–125)
HCT VFR BLD AUTO: 47.1 % (ref 40–53)
HDLC SERPL-MCNC: 61 MG/DL
HGB BLD-MCNC: 15.2 G/DL (ref 13.3–17.7)
LDLC SERPL CALC-MCNC: 137 MG/DL
MCH RBC QN AUTO: 29.1 PG (ref 26.5–33)
MCHC RBC AUTO-ENTMCNC: 32.3 G/DL (ref 31.5–36.5)
MCV RBC AUTO: 90 FL (ref 78–100)
PLATELET # BLD AUTO: 222 10E3/UL (ref 150–450)
POTASSIUM BLD-SCNC: 4.2 MMOL/L (ref 3.5–5)
PROT SERPL-MCNC: 7.4 G/DL (ref 6–8)
PSA SERPL-MCNC: <0.1 UG/L (ref 0–6.5)
RBC # BLD AUTO: 5.22 10E6/UL (ref 4.4–5.9)
SODIUM SERPL-SCNC: 138 MMOL/L (ref 136–145)
TRIGL SERPL-MCNC: 84 MG/DL
WBC # BLD AUTO: 5.4 10E3/UL (ref 4–11)

## 2021-11-22 PROCEDURE — 36415 COLL VENOUS BLD VENIPUNCTURE: CPT

## 2021-11-22 PROCEDURE — 80061 LIPID PANEL: CPT

## 2021-11-22 PROCEDURE — 85027 COMPLETE CBC AUTOMATED: CPT

## 2021-11-22 PROCEDURE — 80053 COMPREHEN METABOLIC PANEL: CPT

## 2021-11-22 PROCEDURE — 82248 BILIRUBIN DIRECT: CPT

## 2021-11-22 PROCEDURE — G0103 PSA SCREENING: HCPCS

## 2021-12-04 ASSESSMENT — ACTIVITIES OF DAILY LIVING (ADL): CURRENT_FUNCTION: NO ASSISTANCE NEEDED

## 2021-12-07 ENCOUNTER — OFFICE VISIT (OUTPATIENT)
Dept: FAMILY MEDICINE | Facility: CLINIC | Age: 76
End: 2021-12-07
Payer: COMMERCIAL

## 2021-12-07 VITALS
SYSTOLIC BLOOD PRESSURE: 139 MMHG | DIASTOLIC BLOOD PRESSURE: 78 MMHG | HEIGHT: 67 IN | HEART RATE: 82 BPM | BODY MASS INDEX: 26.37 KG/M2 | WEIGHT: 168 LBS | TEMPERATURE: 98.5 F | RESPIRATION RATE: 16 BRPM

## 2021-12-07 DIAGNOSIS — Z85.46 HISTORY OF PROSTATE CANCER: ICD-10-CM

## 2021-12-07 DIAGNOSIS — Z01.818 PRE-OPERATIVE EXAMINATION: ICD-10-CM

## 2021-12-07 DIAGNOSIS — Z00.00 ENCOUNTER FOR MEDICARE ANNUAL WELLNESS EXAM: Primary | ICD-10-CM

## 2021-12-07 DIAGNOSIS — D12.6 BENIGN NEOPLASM OF COLON, UNSPECIFIED PART OF COLON: ICD-10-CM

## 2021-12-07 DIAGNOSIS — M72.0 DUPUYTREN'S CONTRACTURE: ICD-10-CM

## 2021-12-07 DIAGNOSIS — R94.31 NONSPECIFIC ABNORMAL ELECTROCARDIOGRAM (ECG) (EKG): ICD-10-CM

## 2021-12-07 DIAGNOSIS — R00.2 PALPITATIONS: ICD-10-CM

## 2021-12-07 LAB
ATRIAL RATE - MUSE: 78 BPM
DIASTOLIC BLOOD PRESSURE - MUSE: NORMAL MMHG
INTERPRETATION ECG - MUSE: NORMAL
P AXIS - MUSE: 67 DEGREES
PR INTERVAL - MUSE: 162 MS
QRS DURATION - MUSE: 128 MS
QT - MUSE: 374 MS
QTC - MUSE: 426 MS
R AXIS - MUSE: -61 DEGREES
SYSTOLIC BLOOD PRESSURE - MUSE: NORMAL MMHG
T AXIS - MUSE: 24 DEGREES
VENTRICULAR RATE- MUSE: 78 BPM

## 2021-12-07 PROCEDURE — 99397 PER PM REEVAL EST PAT 65+ YR: CPT | Performed by: FAMILY MEDICINE

## 2021-12-07 PROCEDURE — 93005 ELECTROCARDIOGRAM TRACING: CPT | Performed by: FAMILY MEDICINE

## 2021-12-07 PROCEDURE — 93010 ELECTROCARDIOGRAM REPORT: CPT | Performed by: INTERNAL MEDICINE

## 2021-12-07 PROCEDURE — 99214 OFFICE O/P EST MOD 30 MIN: CPT | Mod: 25 | Performed by: FAMILY MEDICINE

## 2021-12-07 RX ORDER — OMEPRAZOLE 40 MG/1
20 CAPSULE, DELAYED RELEASE ORAL ONCE
COMMUNITY
Start: 2021-05-03 | End: 2022-07-27

## 2021-12-07 RX ORDER — HYDROCODONE BITARTRATE AND ACETAMINOPHEN 5; 325 MG/1; MG/1
1-2 TABLET ORAL EVERY 6 HOURS PRN
COMMUNITY
Start: 2020-03-04 | End: 2021-12-07

## 2021-12-07 ASSESSMENT — ACTIVITIES OF DAILY LIVING (ADL): CURRENT_FUNCTION: NO ASSISTANCE NEEDED

## 2021-12-07 ASSESSMENT — MIFFLIN-ST. JEOR: SCORE: 1452.73

## 2021-12-07 NOTE — PROGRESS NOTES
[facility]  [address] [facility] [address]  Phone: [phone]  Fax: [phone]  Primary Provider: João Garvin  Pre-op Performing Provider: JOÃO GARVIN      PREOPERATIVE EVALUATION:  Today's date: 12/7/2021    Adebayo Burgess is a 76 year old male who presents for a preoperative evaluation.    Surgical Information:  Surgery/Procedure: Left Mf and Sf subtotal palmar fasciectomy   Surgery Location: [facility]  Surgeon: Dr. Abreu   Surgery Date: 1/6/2022  Time of Surgery: TBD  Where patient plans to recover: At home with family  Fax number for surgical facility: [phone]    Type of Anesthesia Anticipated: General    Assessment & Plan     The proposed surgical procedure is considered LOW risk.        Pre-operative examination  Dupuytren's contracture    Patient is approved for surgery  Sodium is 138 with a potassium of 4.2.  GFR 71  Hemoglobin 15.2 and white count 5400  Recommend avoiding NSAIDs prior to surgery  Follow-up as recommended by orthopedics    Benign neoplasm of colon, unspecified part of colon    Colonoscopy is up-to-date    History of prostate cancer      Palpitations  Nonspecific abnormal electrocardiogram (ECG) (EKG)    EKG reveals a bifascicular block  Refer for an echocardiogram  - EKG 12-lead, tracing only  - Echocardiogram Complete    Encounter for Medicare annual wellness exam             Risks and Recommendations:  The patient has the following additional risks and recommendations for perioperative complications:   - No identified additional risk factors other than previously addressed    Medication Instructions:  Patient is to take all scheduled medications on the day of surgery    RECOMMENDATION:  APPROVAL GIVEN to proceed with proposed procedure, without further diagnostic evaluation.        Subjective     HPI related to upcoming procedure:     This is a pleasant 76-year-old male who  presents to the clinic for preoperative evaluation.  He was seen for an annual wellness visit as well which is documented in a separate note.  He has a history of Dupuytren's contracture of the left palm.  He will require surgery if he has had previous surgery on his right palm and tolerated the procedure well.    His medical history is notable for prostate cancer, hyperlipidemia, gastroesophageal reflux disease with a known hiatal hernia, as well as benign polyps.  Additionally, he has a history of prostate cancer with a previous radical prostatectomy.  He has had a penile prosthesis given a history of erectile dysfunction.  He also has a history of Dupuytren's contracture and did have surgical repair for his right hand by orthopedics previously.     He continues to take atorvastatin 20 mg daily and tolerates the medication well.  He takes a multivitamin and also takes omeprazole for reflux symptoms.  He had an upper endoscopy in August of 2021 showing an esophageal polyp but was otherwise normal.     He has a history of kidney stones and has followed up with urology.    He has had occasional palpitations and will follow-up for an echocardiogram.  Denies chest pain, shortness of breath, palpitations.  He is able to tolerate at least 4 METS of physical activity with out difficulty.      Preop Questions 12/7/2021   1. Have you ever had a heart attack or stroke? No   2. Have you ever had surgery on your heart or blood vessels, such as a stent placement, a coronary artery bypass, or surgery on an artery in your head, neck, heart, or legs? No   3. Do you have chest pain with activity? No   4. Do you have a history of  heart failure? No   5. Do you currently have a cold, bronchitis or symptoms of other infection? No   6. Do you have a cough, shortness of breath, or wheezing? No   7. Do you or anyone in your family have previous history of blood clots? YES -    8. Do you or does anyone in your family have a serious  bleeding problem such as prolonged bleeding following surgeries or cuts? No   9. Have you ever had problems with anemia or been told to take iron pills? No   10. Have you had any abnormal blood loss such as black, tarry or bloody stools? No   11. Have you ever had a blood transfusion? No   12. Are you willing to have a blood transfusion if it is medically needed before, during, or after your surgery? Yes   13. Have you or any of your relatives ever had problems with anesthesia? No   14. Do you have sleep apnea, excessive snoring or daytime drowsiness? No   15. Do you have any artifical heart valves or other implanted medical devices like a pacemaker, defibrillator, or continuous glucose monitor? No   16. Do you have artificial joints? No   17. Are you allergic to latex? No       Health Care Directive:  Patient does not have a Health Care Directive or Living Will: Patient states has Advance Directive and will bring in a copy to clinic.    Preoperative Review of :   reviewed - no record of controlled substances prescribed.      Status of Chronic Conditions:  See problem list for active medical problems.  Problems all longstanding and stable, except as noted/documented.  See ROS for pertinent symptoms related to these conditions.      Review of Systems  CONSTITUTIONAL: NEGATIVE for fever, chills, change in weight  INTEGUMENTARY/SKIN: NEGATIVE for worrisome rashes, moles or lesions  EYES: NEGATIVE for vision changes or irritation  ENT/MOUTH: NEGATIVE for ear, mouth and throat problems  RESP: NEGATIVE for significant cough or SOB  CV: NEGATIVE for chest pain, palpitations or peripheral edema  GI: NEGATIVE for nausea, abdominal pain, heartburn, or change in bowel habits  : NEGATIVE for frequency, dysuria, or hematuria  MUSCULOSKELETAL: NEGATIVE for significant arthralgias or myalgia  NEURO: NEGATIVE for weakness, dizziness or paresthesias  ENDOCRINE: NEGATIVE for temperature intolerance, skin/hair changes  HEME:  NEGATIVE for bleeding problems  PSYCHIATRIC: NEGATIVE for changes in mood or affect    Patient Active Problem List    Diagnosis Date Noted     Esophagitis 05/12/2020     Priority: Medium     History of prostate cancer      Priority: Medium     Created by Conversion         Overweight      Priority: Medium     Created by Conversion         Nephrolithiasis 09/15/2017     Priority: Medium     Hypercholesterolemia      Priority: Medium     Created by Conversion         Compression Fracture Of Thoracic Vertebral Body      Priority: Medium     Created by Conversion  Replacement Utility updated for latest IMO load         Skin Neoplasm Of Uncertain Behavior      Priority: Medium     Created by Conversion         Male Erectile Disorder      Priority: Medium     Created by Conversion         Decrease In Height      Priority: Medium     Created by Conversion         Seborrheic Keratosis      Priority: Medium     Created by Conversion         Lentigo      Priority: Medium     Created by Conversion         Rotator Cuff Tendon Tear      Priority: Medium     Created by Conversion         Eustachian Tube Dysfunction      Priority: Medium     Created by Conversion         Hemangioma Of The Skin      Priority: Medium     Created by Conversion         Esophageal Reflux      Priority: Medium     Created by Conversion         Dupuytren's Contracture      Priority: Medium     Created by Conversion         Benign Adenomatous Polyp Of The Large Intestine      Priority: Medium     Created by Conversion          History reviewed. No pertinent past medical history.  Past Surgical History:   Procedure Laterality Date     C REMV PROSTATE,SUPRAPUBIC,SUBTOTAL      Description: Prostatectomy, Suprapubic;  Recorded: 10/18/2010;     OTHER SURGICAL HISTORY      Right hand surgery for Dupuytren's contracture     Current Outpatient Medications   Medication Sig Dispense Refill     atorvastatin (LIPITOR) 20 MG tablet [ATORVASTATIN (LIPITOR) 20 MG  "TABLET] Take 1 tablet by mouth once daily 90 tablet 2     MULTIVIT &MINERALS/FERROUS FUM (MULTI VITAMIN ORAL) [MULTIVIT &MINERALS/FERROUS FUM (MULTI VITAMIN ORAL)] Take by mouth.       omeprazole (PRILOSEC) 40 MG DR capsule take 1 by Oral route 2 times every day Take 30 min before breakfast and 30 min before dinner.         No Known Allergies     Social History     Tobacco Use     Smoking status: Never Smoker     Smokeless tobacco: Never Used   Substance Use Topics     Alcohol use: Not on file     History reviewed. No pertinent family history.  History   Drug Use Not on file         Objective     BP (!) 151/80 (BP Location: Left arm, Patient Position: Sitting, Cuff Size: Adult Regular)   Pulse 91   Temp 98.5  F (36.9  C) (Oral)   Resp 16   Ht 1.689 m (5' 6.5\")   Wt 76.2 kg (168 lb)   BMI 26.71 kg/m      Physical Exam    GENERAL APPEARANCE: healthy, alert and no distress     EYES: EOMI,  PERRL     HENT: ear canals and TM's normal and nose and mouth without ulcers or lesions     NECK: no adenopathy, no asymmetry, masses, or scars and thyroid normal to palpation     RESP: lungs clear to auscultation - no rales, rhonchi or wheezes     CV: regular rates and rhythm, normal S1 S2, no S3 or S4 and no murmur, click or rub     ABDOMEN: soft, nontender     MS: extremities normal- no gross deformities noted, no evidence of inflammation in joints, FROM in all extremities.     SKIN: no suspicious lesions or rashes     NEURO: Normal strength and tone, sensory exam grossly normal, mentation intact and speech normal     PSYCH: mentation appears normal. and affect normal/bright     LYMPHATICS: No cervical adenopathy    Recent Labs   Lab Test 11/22/21  0929 11/22/21  0928 01/18/21  0951   HGB  --  15.2 14.9   PLT  --  222 198     --  138   POTASSIUM 4.2  --  4.3   CR 1.03  --  0.98        Diagnostics:     EEG: Bifascicular block    Revised Cardiac Risk Index (RCRI):  The patient has the following serious cardiovascular " "risks for perioperative complications:   - No serious cardiac risks = 0 points     RCRI Interpretation: 0 points: Class I (very low risk - 0.4% complication rate)           Signed Electronically by: Scar Ambrosio MD  Copy of this evaluation report is provided to requesting physician.      Answers for HPI/ROS submitted by the patient on 12/4/2021  In general, how would you rate your overall physical health?: good  Frequency of exercise:: 4-5 days/week  Do you usually eat at least 4 servings of fruit and vegetables a day, include whole grains & fiber, and avoid regularly eating high fat or \"junk\" foods? : No  Taking medications regularly:: Yes  Medication side effects:: None  Activities of Daily Living: no assistance needed  Home safety: no safety concerns identified  Hearing Impairment:: difficulty following a conversation in a noisy restaurant or crowded room, need to ask people to speak up or repeat themselves  In the past 6 months, have you been bothered by leaking of urine?: No  In general, how would you rate your overall mental or emotional health?: good  Additional concerns today:: Yes  Duration of exercise:: 30-45 minutes      "

## 2021-12-07 NOTE — PROGRESS NOTES
"    The patient was counseled and encouraged to consider modifying their diet and eating habits. He was provided with information on recommended healthy diet options.  The patient was provided with written information regarding signs of hearing loss.  Answers for HPI/ROS submitted by the patient on 12/4/2021  In general, how would you rate your overall physical health?: good  Frequency of exercise:: 4-5 days/week  Do you usually eat at least 4 servings of fruit and vegetables a day, include whole grains & fiber, and avoid regularly eating high fat or \"junk\" foods? : No  Taking medications regularly:: Yes  Medication side effects:: None  Activities of Daily Living: no assistance needed  Home safety: no safety concerns identified  Hearing Impairment:: difficulty following a conversation in a noisy restaurant or crowded room, need to ask people to speak up or repeat themselves  In the past 6 months, have you been bothered by leaking of urine?: No  In general, how would you rate your overall mental or emotional health?: good  Additional concerns today:: Yes  Duration of exercise:: 30-45 minutes      "

## 2021-12-07 NOTE — PATIENT INSTRUCTIONS
Earnest,     Set up the echocardiogram to further evaluate the heart  Continue current medications  You may have your surgery as planned.      Patient Education   Personalized Prevention Plan  You are due for the preventive services outlined below.  Your care team is available to assist you in scheduling these services.  If you have already completed any of these items, please share that information with your care team to update in your medical record.  Health Maintenance Due   Topic Date Due     ANNUAL REVIEW OF HM ORDERS  Never done       Understanding USDA MyPlate  The USDA has guidelines to help you make healthy food choices. These are called MyPlate. MyPlate shows the food groups that make up healthy meals using the image of a place setting. Before you eat, think about the healthiest choices for what to put on your plate or in your cup or bowl. To learn more about building a healthy plate, visit www.[employer] Paint[employer].gov.    The food groups    Fruits. Any fruit or 100% fruit juice counts as part of the Fruit Group. Fruits may be fresh, canned, frozen, or dried, and may be whole, cut-up, or pureed. Make 1/2 of your plate fruits and vegetables.    Vegetables. Any vegetable or 100% vegetable juice counts as a member of the Vegetable Group. Vegetables may be fresh, frozen, canned, or dried. They can be served raw or cooked and may be whole, cut-up, or mashed. Make 1/2 of your plate fruits and vegetables.    Grains. All foods made from grains are part of the Grains Group. These include wheat, rice, oats, cornmeal, and barley. Grains are often used to make foods such as bread, pasta, oatmeal, cereal, tortillas, and grits. Grains should be no more than 1/4 of your plate. At least half of your grains should be whole grains.    Protein. This group includes meat, poultry, seafood, beans and peas, eggs, processed soy products (such as tofu), nuts (including nut butters), and seeds. Make protein choices no more than 1/4 of your  plate. Meat and poultry choices should be lean or low fat.    Dairy. The Dairy Group includes all fluid milk products and foods made from milk that contain calcium, such as yogurt and cheese. (Foods that have little calcium, such as cream, butter, and cream cheese, are not part of this group.) Most dairy choices should be low-fat or fat-free.    Oils. Oils aren't a food group, but they do contain essential nutrients. However it's important to watch your intake of oils. These are fats that are liquid at room temperature. They include canola, corn, olive, soybean, vegetable, and sunflower oil. Foods that are mainly oil include mayonnaise, certain salad dressings, and soft margarines. You likely already get your daily oil allowance from the foods you eat.  Things to limit  Eating healthy also means limiting these things in your diet:       Salt (sodium). Many processed foods have a lot of sodium. To keep sodium intake down, eat fresh vegetables, meats, poultry, and seafood when possible. Purchase low-sodium, reduced-sodium, or no-salt-added food products at the store. And don't add salt to your meals at home. Instead, season them with herbs and spices such as dill, oregano, cumin, and paprika. Or try adding flavor with lemon or lime zest and juice.    Saturated fat. Saturated fats are most often found in animal products such as beef, pork, and chicken. They are often solid at room temperature, such as butter. To reduce your saturated fat intake, choose leaner cuts of meat and poultry. And try healthier cooking methods such as grilling, broiling, roasting, or baking. For a simple lower-fat swap, use plain nonfat yogurt instead of mayonnaise when making potato salad or macaroni salad.    Added sugars. These are sugars added to foods. They are in foods such as ice cream, candy, soda, fruit drinks, sports drinks, energy drinks, cookies, pastries, jams, and syrups. Cut down on added sugars by sharing sweet treats with a  family member or friend. You can also choose fruit for dessert, and drink water or other unsweetened beverages.     StayWell last reviewed this educational content on 6/1/2020 2000-2021 [employer]. All rights reserved. This information is not intended as a substitute for professional medical care. Always follow your healthcare professional's instructions.          Signs of Hearing Loss      Hearing much better with one ear can be a sign of hearing loss.   Hearing loss is a problem shared by many people. In fact, it is one of the most common health problems, particularly as people age. Most people age 65 and older have some hearing loss. By age 80, almost everyone does. Hearing loss often occurs slowly over the years. So you may not realize your hearing has gotten worse.  Have your hearing checked  Call your healthcare provider if you:    Have to strain to hear normal conversation    Have to watch other people s faces very carefully to follow what they re saying    Need to ask people to repeat what they ve said    Often misunderstand what people are saying    Turn the volume of the television or radio up so high that others complain    Feel that people are mumbling when they re talking to you    Find that the effort to hear leaves you feeling tired and irritated    Notice, when using the phone, that you hear better with one ear than the other  StayWell last reviewed this educational content on 1/1/2020 2000-2021 [employer]. All rights reserved. This information is not intended as a substitute for professional medical care. Always follow your healthcare professional's instructions.

## 2021-12-07 NOTE — PROGRESS NOTES
"SUBJECTIVE:   Adebayo Burgess is a 76 year old male who presents for Preventive Visit.    His medical history is notable for prostate cancer, hyperlipidemia, gastroesophageal reflux disease with a known hiatal hernia, as well as benign polyps.  Additionally, he has a history of prostate cancer with a previous radical prostatectomy.  He has had a penile prosthesis given a history of erectile dysfunction.  He also has a history of Dupuytren's contracture and did have surgical repair for his right hand by orthopedics previously.     He continues to take atorvastatin 20 mg daily and tolerates the medication well.  He takes a multivitamin and also takes omeprazole for reflux symptoms.  He had an upper endoscopy in August of 2021 showing an esophageal polyp but was otherwise normal.     He has a history of kidney stones and has followed up with urology.    Additionally, he has a history of Dupuytren's contracture.  He had previous surgery on his right hand and now will and will require surgery on his left hand in January of 2022.    His last total cholesterol 15 with LDL of 137.  Triglycerides and HDL were 84 and 61 respectively.    He recently did experience an episode where he felt his heart was racing.  His heart rate was 115.  He felt like his heart was skipping beats as well.    Overall, he currently feels well.  Remains physically active.    He has tolerated anesthesia previously without difficulty.      {  Patient has been advised of split billing requirements and indicates understanding: Yes   Are you in the first 12 months of your Medicare coverage?  No    Healthy Habits:    In general, how would you rate your overall health?  Good    Frequency of exercise:  4-5 days/week    Duration of exercise:  30-45 minutes    Do you usually eat at least 4 servings of fruit and vegetables a day, include whole grains    & fiber and avoid regularly eating high fat or \"junk\" foods?  No    Taking medications regularly:  Yes    " Medication side effects:  None    Ability to successfully perform activities of daily living:  No assistance needed    Home Safety:  No safety concerns identified    Hearing Impairment:  Difficulty following a conversation in a noisy restaurant or crowded room and need to ask people to speak up or repeat themselves    In the past 6 months, have you been bothered by leaking of urine?  No    In general, how would you rate your overall mental or emotional health?  Good      PHQ-2 Total Score:    Additional concerns today:  Yes    Do you feel safe in your environment? Yes    Have you ever done Advance Care Planning? (For example, a Health Directive, POLST, or a discussion with a medical provider or your loved ones about your wishes): Yes, patient states has an Advance Care Planning document and will bring a copy to the clinic.       Fall risk  Fallen 2 or more times in the past year?: No  Any fall with injury in the past year?: No    Cognitive Screening   1) Repeat 3 items (Leader, Season, Table)    2) Clock draw:NORMAL  3) 3 item recall: Recalls 3 objects  Results: 3 items recalled: COGNITIVE IMPAIRMENT LESS LIKELY    Mini-CogTM Copyright ROBERT Hernadez. Licensed by the author for use in [facility]; reprinted with permission (gary@[facility]). All rights reserved.      Do you have sleep apnea, excessive snoring or daytime drowsiness?: no    Reviewed and updated as needed this visit by clinical staff  Tobacco  Allergies  Meds             Reviewed and updated as needed this visit by Provider               Social History     Tobacco Use     Smoking status: Never Smoker     Smokeless tobacco: Never Used   Substance Use Topics     Alcohol use: Not on file     If you drink alcohol do you typically have >3 drinks per day or >7 drinks per week? No    Alcohol Use 12/4/2021   Prescreen: >3 drinks/day or >7 drinks/week? No   No flowsheet data found.        Current providers sharing in care for this patient include:    Patient Care Team:  Scar Ambrosio MD as PCP - General (Family Practice)  Scar Ambrosio MD as Assigned PCP    The following health maintenance items are reviewed in Epic and correct as of today:  Health Maintenance Due   Topic Date Due     ANNUAL REVIEW OF  ORDERS  Never done     MEDICARE ANNUAL WELLNESS VISIT  03/09/2021     Patient Active Problem List   Diagnosis     Male Erectile Disorder     Overweight     Compression Fracture Of Thoracic Vertebral Body     Decrease In Height     Seborrheic Keratosis     Lentigo     Rotator Cuff Tendon Tear     Eustachian Tube Dysfunction     Hemangioma Of The Skin     History of prostate cancer     Skin Neoplasm Of Uncertain Behavior     Hypercholesterolemia     Esophageal Reflux     Dupuytren's Contracture     Benign Adenomatous Polyp Of The Large Intestine     Nephrolithiasis     Esophagitis     Past Surgical History:   Procedure Laterality Date     C REMV PROSTATE,SUPRAPUBIC,SUBTOTAL      Description: Prostatectomy, Suprapubic;  Recorded: 10/18/2010;     OTHER SURGICAL HISTORY      Right hand surgery for Dupuytren's contracture       Social History     Tobacco Use     Smoking status: Never Smoker     Smokeless tobacco: Never Used   Substance Use Topics     Alcohol use: Not on file     History reviewed. No pertinent family history.      Current Outpatient Medications   Medication Sig Dispense Refill     atorvastatin (LIPITOR) 20 MG tablet [ATORVASTATIN (LIPITOR) 20 MG TABLET] Take 1 tablet by mouth once daily 90 tablet 2     MULTIVIT &MINERALS/FERROUS FUM (MULTI VITAMIN ORAL) [MULTIVIT &MINERALS/FERROUS FUM (MULTI VITAMIN ORAL)] Take by mouth.       omeprazole (PRILOSEC) 40 MG DR capsule take 1 by Oral route 2 times every day Take 30 min before breakfast and 30 min before dinner.       No Known Allergies  See chart note        Review of Systems  Constitutional, HEENT, cardiovascular, pulmonary, GI, , musculoskeletal, neuro, skin, endocrine and psych systems  "are negative, except as otherwise noted.    OBJECTIVE:   BP (!) 151/80 (BP Location: Left arm, Patient Position: Sitting, Cuff Size: Adult Regular)   Pulse 91   Temp 98.5  F (36.9  C) (Oral)   Resp 16   Ht 1.689 m (5' 6.5\")   Wt 76.2 kg (168 lb)   BMI 26.71 kg/m   Estimated body mass index is 26.71 kg/m  as calculated from the following:    Height as of this encounter: 1.689 m (5' 6.5\").    Weight as of this encounter: 76.2 kg (168 lb).  Physical Exam  GENERAL: healthy, alert and no distress  EYES: Eyes grossly normal to inspection, PERRL and conjunctivae and sclerae normal  HENT: ear canals and TM's normal, nose and mouth without ulcers or lesions  NECK: no adenopathy, no asymmetry, masses, or scars and thyroid normal to palpation  RESP: lungs clear to auscultation - no rales, rhonchi or wheezes  CV: regular rate and rhythm, normal S1 S2, no S3 or S4, no murmur, click or rub, no peripheral edema and peripheral pulses strong  ABDOMEN: soft, nontender  MS: no gross musculoskeletal defects noted, no edema  SKIN: no suspicious lesions or rashes  NEURO: Normal strength and tone, mentation intact and speech normal  PSYCH: mentation appears normal, affect normal/bright    Diagnostic Test Results:  Labs reviewed in Epic    ASSESSMENT / PLAN:   Adebayo was seen today for wellness visit.    Diagnoses and all orders for this visit:    Encounter for Medicare annual wellness exam    Recommend remaining physically active  Reviewed the annual wellness visit health risk assessment form    Pre-operative examination  Dupuytren's contracture    Refer to separate preoperative note note    Benign neoplasm of colon, unspecified part of colon    His colonoscopy is up-to-date from September 2018.  He is due in September of 023    History of prostate cancer  Status post radical prostatectomy    Continue follow-up with urology as needed    Palpitations  Nonspecific abnormal electrocardiogram (ECG) (EKG)  -     Echocardiogram Complete; " "Future    EKG is abnormal and reveals a bifascicular block  Refer for an echocardiogram  Consider also a Holter test given a history of palpitations  Will await his echocardiogram. Consider a referral to cardiology depending on findings    -     EKG 12-lead, tracing only  -     Echocardiogram Complete; Future        Patient has been advised of split billing requirements and indicates understanding: Yes  COUNSELING:  Reviewed preventive health counseling, as reflected in patient instructions       Regular exercise       Vision screening       Dental care       Alcohol Use        Colon cancer screening       Prostate cancer screening    Estimated body mass index is 26.71 kg/m  as calculated from the following:    Height as of this encounter: 1.689 m (5' 6.5\").    Weight as of this encounter: 76.2 kg (168 lb).    Weight management plan: Discussed healthy diet and exercise guidelines    He reports that he has never smoked. He has never used smokeless tobacco.      Appropriate preventive services were discussed with this patient, including applicable screening as appropriate for cardiovascular disease, diabetes, osteopenia/osteoporosis, and glaucoma.  As appropriate for age/gender, discussed screening for colorectal cancer, prostate cancer, breast cancer, and cervical cancer. Checklist reviewing preventive services available has been given to the patient.    Reviewed patients plan of care and provided an AVS. The Basic Care Plan (routine screening as documented in Health Maintenance) for Adebayo meets the Care Plan requirement. This Care Plan has been established and reviewed with the Patient.    Counseling Resources:  ATP IV Guidelines  Pooled Cohorts Equation Calculator  Breast Cancer Risk Calculator  Breast Cancer: Medication to Reduce Risk  FRAX Risk Assessment  ICSI Preventive Guidelines  Dietary Guidelines for Americans, 2010  [employer]'s MyPlate  ASA Prophylaxis  Lung CA Screening    Scar Ambrosio MD  [facility] " [facility]    Identified Health Risks:

## 2021-12-28 ENCOUNTER — TELEPHONE (OUTPATIENT)
Dept: FAMILY MEDICINE | Facility: CLINIC | Age: 76
End: 2021-12-28
Payer: COMMERCIAL

## 2021-12-28 DIAGNOSIS — Z01.818 PRE-OPERATIVE EXAMINATION: Primary | ICD-10-CM

## 2021-12-28 NOTE — TELEPHONE ENCOUNTER
Patient is requesting a covid test for surgery. Patient was seen by pcp for pre op on 12/7. Please place order for covid test. Patient is scheduled to have covid test done on 1/3.

## 2021-12-29 NOTE — TELEPHONE ENCOUNTER
I entered an order for Covid test. Please also make sure that the pre-op was forwarded to [place] from December 7, 2021.  Thanks!

## 2021-12-30 ENCOUNTER — HOSPITAL ENCOUNTER (OUTPATIENT)
Dept: CARDIOLOGY | Facility: HOSPITAL | Age: 76
Discharge: HOME | End: 2021-12-30
Attending: FAMILY MEDICINE | Admitting: FAMILY MEDICINE
Payer: COMMERCIAL

## 2021-12-30 DIAGNOSIS — R94.31 NONSPECIFIC ABNORMAL ELECTROCARDIOGRAM (ECG) (EKG): ICD-10-CM

## 2021-12-30 DIAGNOSIS — R00.2 PALPITATIONS: ICD-10-CM

## 2021-12-30 LAB — LVEF ECHO: NORMAL

## 2021-12-30 PROCEDURE — 93306 TTE W/DOPPLER COMPLETE: CPT

## 2021-12-30 PROCEDURE — 93306 TTE W/DOPPLER COMPLETE: CPT | Mod: 26 | Performed by: INTERNAL MEDICINE

## 2022-01-03 ENCOUNTER — LAB (OUTPATIENT)
Dept: LAB | Facility: CLINIC | Age: 77
End: 2022-01-03
Attending: FAMILY MEDICINE
Payer: COMMERCIAL

## 2022-01-03 DIAGNOSIS — Z01.818 PRE-OPERATIVE EXAMINATION: ICD-10-CM

## 2022-01-03 PROCEDURE — U0003 INFECTIOUS AGENT DETECTION BY NUCLEIC ACID (DNA OR RNA); SEVERE ACUTE RESPIRATORY SYNDROME CORONAVIRUS 2 (SARS-COV-2) (CORONAVIRUS DISEASE [COVID-19]), AMPLIFIED PROBE TECHNIQUE, MAKING USE OF HIGH THROUGHPUT TECHNOLOGIES AS DESCRIBED BY CMS-2020-01-R: HCPCS

## 2022-01-03 PROCEDURE — U0005 INFEC AGEN DETEC AMPLI PROBE: HCPCS

## 2022-01-05 LAB — SARS-COV-2 RNA RESP QL NAA+PROBE: NEGATIVE

## 2022-02-07 ENCOUNTER — TRANSFERRED RECORDS (OUTPATIENT)
Dept: HEALTH INFORMATION MANAGEMENT | Facility: CLINIC | Age: 77
End: 2022-02-07
Payer: COMMERCIAL

## 2022-04-18 DIAGNOSIS — E78.5 HYPERLIPIDEMIA: ICD-10-CM

## 2022-04-20 RX ORDER — ATORVASTATIN CALCIUM 20 MG/1
TABLET, FILM COATED ORAL
Qty: 90 TABLET | Refills: 2 | Status: SHIPPED | OUTPATIENT
Start: 2022-04-20 | End: 2022-12-14

## 2022-04-20 NOTE — TELEPHONE ENCOUNTER
"Last Written Prescription Date:  4/30/21  Last Fill Quantity: 90,  # refills: 2   Last office visit provider:  12/7/21     Requested Prescriptions   Pending Prescriptions Disp Refills     atorvastatin (LIPITOR) 20 MG tablet [Pharmacy Med Name: Atorvastatin Calcium 20 MG Oral Tablet] 90 tablet 0     Sig: Take 1 tablet by mouth once daily       Statins Protocol Passed - 4/20/2022  2:57 PM        Passed - LDL on file in past 12 months     Recent Labs   Lab Test 11/22/21  0929   *             Passed - No abnormal creatine kinase in past 12 months     No lab results found.             Passed - Recent (12 mo) or future (30 days) visit within the authorizing provider's specialty     Patient has had an office visit with the authorizing provider or a provider within the authorizing providers department within the previous 12 mos or has a future within next 30 days. See \"Patient Info\" tab in inbasket, or \"Choose Columns\" in Meds & Orders section of the refill encounter.              Passed - Medication is active on med list        Passed - Patient is age 18 or older             Ady Tanner RN 04/20/22 2:57 PM  "

## 2022-07-27 ENCOUNTER — OFFICE VISIT (OUTPATIENT)
Dept: FAMILY MEDICINE | Facility: CLINIC | Age: 77
End: 2022-07-27
Payer: COMMERCIAL

## 2022-07-27 VITALS
RESPIRATION RATE: 16 BRPM | BODY MASS INDEX: 26.68 KG/M2 | SYSTOLIC BLOOD PRESSURE: 142 MMHG | DIASTOLIC BLOOD PRESSURE: 82 MMHG | HEART RATE: 69 BPM | WEIGHT: 167.8 LBS

## 2022-07-27 DIAGNOSIS — R03.0 ELEVATED BLOOD PRESSURE READING WITHOUT DIAGNOSIS OF HYPERTENSION: Primary | ICD-10-CM

## 2022-07-27 DIAGNOSIS — R00.2 PALPITATIONS: ICD-10-CM

## 2022-07-27 DIAGNOSIS — E78.00 HYPERCHOLESTEROLEMIA: ICD-10-CM

## 2022-07-27 PROCEDURE — 99213 OFFICE O/P EST LOW 20 MIN: CPT | Performed by: FAMILY MEDICINE

## 2022-07-27 NOTE — PROGRESS NOTES
Assessment & Plan     Elevated blood pressure reading without diagnosis of hypertension    Reviewed his blood pressure today  Reviewed his home blood pressure readings in detail which are generally well controlled  Continue to work on dietary and lifestyle changes  Limit dietary sodium  Continue to monitor.  If his blood pressure remains significantly elevated then consider an antihypertensive medication    Hypercholesterolemia    Continue atorvastatin  Check a lipid cascade in the future    Palpitations    Reviewed the previous EKG revealing a right bundle branch block  Reviewed his echocardiogram in detail which is essentially normal  Follow-up as needed    Spent 30 minutes including time for chart preparation and review as well as time with patient    Review of external notes as documented elsewhere in note  No follow-ups on file.    Scar Ambrosio MD  [facility]    Severo Tinoco is a 77 year old, presenting for the following health issues:  Follow Up    This is a pleasant 77-year-old male who presents to the clinic in follow-up.  At the last visit he reported palpitations.  An EKG at the time revealed a right bundle branch block.  He therefore was referred for an echocardiogram that revealed a normal ejection fraction of 60-65%.  No significant valvular abnormalities were seen.  There is a possibility of early diastolic dysfunction.  His blood pressure has been more elevated.  He has been getting multiple blood pressure readings at home.  Systolic blood pressure readings are typically in the 120s and 130s with occasional readings in the 140s.  Diastolic readings are typically in the 60s and 70s.  His average blood pressure is 130/71 with an average heart rate of 72.  He has been working on remaining physically active.  He does note that he can feel mildly short of breath but that seems to be related more to the heat.    He has a history of hypercholesterolemia and his last  total cholesterol was 215 with an LDL of 137.  He has been taking atorvastatin 20 mg daily.    His medical history is otherwise notable for prostate cancer, hyperlipidemia, gastroesophageal reflux disease with a known hiatal hernia, as well as benign polyps.  Additionally, he has a history of prostate cancer with a previous radical prostatectomy.  He has had a penile prosthesis given a history of erectile dysfunction.  He also has a history of Dupuytren's contracture and did have surgical repair for his right hand by orthopedics previously.     He takes a multivitamin and also takes omeprazole for reflux symptoms.  He had an upper endoscopy in August of 2021 showing an esophageal polyp but was otherwise normal.     He has a history of kidney stones and has followed up with urology.       History of Present Illness       Vascular Disease:  He presents for follow up of vascular disease.  He never takes nitroglycerin. He is not taking daily aspirin.He consumes 0 sweetened beverage(s) daily. He exercises with enough effort to increase his heart rate 4 days per week.   He is taking medications regularly.             Review of Systems         Objective    BP (!) 150/79 (BP Location: Left arm, Patient Position: Sitting, Cuff Size: Adult Regular)   Pulse 69   Resp 16   Wt 76.1 kg (167 lb 12.8 oz)   BMI 26.68 kg/m    Body mass index is 26.68 kg/m .  Physical Exam   GENERAL: healthy, alert and no distress  RESP: lungs clear to auscultation - no rales, rhonchi or wheezes  CV: regular rate and rhythm, normal S1 S2, no S3 or S4, no murmur, click or rub, no peripheral edema and peripheral pulses strong  PSYCH: mentation appears normal, affect normal/bright                    .  ..

## 2022-11-20 ENCOUNTER — HEALTH MAINTENANCE LETTER (OUTPATIENT)
Age: 77
End: 2022-11-20

## 2022-11-23 ENCOUNTER — LAB (OUTPATIENT)
Dept: LAB | Facility: CLINIC | Age: 77
End: 2022-11-23
Payer: COMMERCIAL

## 2022-11-23 DIAGNOSIS — Z85.46 HISTORY OF PROSTATE CANCER: ICD-10-CM

## 2022-11-23 DIAGNOSIS — Z85.46 HISTORY OF PROSTATE CANCER: Primary | ICD-10-CM

## 2022-11-23 DIAGNOSIS — E78.00 HYPERCHOLESTEROLEMIA: ICD-10-CM

## 2022-11-23 DIAGNOSIS — E78.00 HYPERCHOLESTEROLEMIA: Primary | ICD-10-CM

## 2022-11-23 LAB
ALBUMIN SERPL BCG-MCNC: 4.4 G/DL (ref 3.5–5.2)
ALP SERPL-CCNC: 76 U/L (ref 40–129)
ALT SERPL W P-5'-P-CCNC: 28 U/L (ref 10–50)
ANION GAP SERPL CALCULATED.3IONS-SCNC: 10 MMOL/L (ref 7–15)
AST SERPL W P-5'-P-CCNC: 40 U/L (ref 10–50)
BILIRUB DIRECT SERPL-MCNC: <0.2 MG/DL (ref 0–0.3)
BILIRUB SERPL-MCNC: 0.7 MG/DL
BUN SERPL-MCNC: 17.8 MG/DL (ref 8–23)
CALCIUM SERPL-MCNC: 9.1 MG/DL (ref 8.8–10.2)
CHLORIDE SERPL-SCNC: 100 MMOL/L (ref 98–107)
CHOLEST SERPL-MCNC: 189 MG/DL
CREAT SERPL-MCNC: 1.11 MG/DL (ref 0.67–1.17)
DEPRECATED HCO3 PLAS-SCNC: 26 MMOL/L (ref 22–29)
ERYTHROCYTE [DISTWIDTH] IN BLOOD BY AUTOMATED COUNT: 11.8 % (ref 10–15)
GFR SERPL CREATININE-BSD FRML MDRD: 69 ML/MIN/1.73M2
GLUCOSE SERPL-MCNC: 93 MG/DL (ref 70–99)
HCT VFR BLD AUTO: 43.3 % (ref 40–53)
HDLC SERPL-MCNC: 67 MG/DL
HGB BLD-MCNC: 14.3 G/DL (ref 13.3–17.7)
LDLC SERPL CALC-MCNC: 106 MG/DL
MCH RBC QN AUTO: 29.7 PG (ref 26.5–33)
MCHC RBC AUTO-ENTMCNC: 33 G/DL (ref 31.5–36.5)
MCV RBC AUTO: 90 FL (ref 78–100)
NONHDLC SERPL-MCNC: 122 MG/DL
PLATELET # BLD AUTO: 206 10E3/UL (ref 150–450)
POTASSIUM SERPL-SCNC: 5.6 MMOL/L (ref 3.4–5.3)
PROT SERPL-MCNC: 7.3 G/DL (ref 6.4–8.3)
PSA SERPL-MCNC: <0.01 NG/ML (ref 0–6.5)
RBC # BLD AUTO: 4.82 10E6/UL (ref 4.4–5.9)
SODIUM SERPL-SCNC: 136 MMOL/L (ref 136–145)
TRIGL SERPL-MCNC: 80 MG/DL
WBC # BLD AUTO: 6 10E3/UL (ref 4–11)

## 2022-11-23 PROCEDURE — 84403 ASSAY OF TOTAL TESTOSTERONE: CPT

## 2022-11-23 PROCEDURE — 84153 ASSAY OF PSA TOTAL: CPT

## 2022-11-23 PROCEDURE — 82248 BILIRUBIN DIRECT: CPT

## 2022-11-23 PROCEDURE — 80061 LIPID PANEL: CPT

## 2022-11-23 PROCEDURE — 84270 ASSAY OF SEX HORMONE GLOBUL: CPT

## 2022-11-23 PROCEDURE — 85027 COMPLETE CBC AUTOMATED: CPT

## 2022-11-23 PROCEDURE — 36415 COLL VENOUS BLD VENIPUNCTURE: CPT

## 2022-11-23 PROCEDURE — 80053 COMPREHEN METABOLIC PANEL: CPT

## 2022-11-25 LAB — SHBG SERPL-SCNC: 52 NMOL/L (ref 11–80)

## 2022-11-28 LAB
TESTOST FREE SERPL-MCNC: 8.73 NG/DL
TESTOST SERPL-MCNC: 553 NG/DL (ref 240–950)

## 2022-12-07 ASSESSMENT — ENCOUNTER SYMPTOMS
HEARTBURN: 0
PALPITATIONS: 0
DYSURIA: 0
CONSTIPATION: 0
JOINT SWELLING: 1
ABDOMINAL PAIN: 0
HEADACHES: 0
MYALGIAS: 0
SORE THROAT: 1
CHILLS: 0
ARTHRALGIAS: 1
FEVER: 0
EYE PAIN: 0
PARESTHESIAS: 0
NERVOUS/ANXIOUS: 0
NAUSEA: 0
DIARRHEA: 0
HEMATURIA: 0
HEMATOCHEZIA: 0
COUGH: 0
SHORTNESS OF BREATH: 0
WEAKNESS: 0
FREQUENCY: 0
DIZZINESS: 0

## 2022-12-07 ASSESSMENT — ACTIVITIES OF DAILY LIVING (ADL): CURRENT_FUNCTION: NO ASSISTANCE NEEDED

## 2022-12-12 ENCOUNTER — TRANSFERRED RECORDS (OUTPATIENT)
Dept: HEALTH INFORMATION MANAGEMENT | Facility: CLINIC | Age: 77
End: 2022-12-12

## 2022-12-14 ENCOUNTER — OFFICE VISIT (OUTPATIENT)
Dept: FAMILY MEDICINE | Facility: CLINIC | Age: 77
End: 2022-12-14
Payer: COMMERCIAL

## 2022-12-14 VITALS
WEIGHT: 170.8 LBS | SYSTOLIC BLOOD PRESSURE: 152 MMHG | OXYGEN SATURATION: 97 % | RESPIRATION RATE: 16 BRPM | BODY MASS INDEX: 26.81 KG/M2 | HEART RATE: 92 BPM | DIASTOLIC BLOOD PRESSURE: 83 MMHG | HEIGHT: 67 IN | TEMPERATURE: 98.3 F

## 2022-12-14 DIAGNOSIS — Z85.46 HISTORY OF PROSTATE CANCER: ICD-10-CM

## 2022-12-14 DIAGNOSIS — Z12.11 SCREEN FOR COLON CANCER: ICD-10-CM

## 2022-12-14 DIAGNOSIS — Z00.00 ENCOUNTER FOR MEDICARE ANNUAL WELLNESS EXAM: Primary | ICD-10-CM

## 2022-12-14 DIAGNOSIS — M72.0 CONTRACTURE OF PALMAR FASCIA: ICD-10-CM

## 2022-12-14 DIAGNOSIS — D12.6 BENIGN NEOPLASM OF COLON, UNSPECIFIED PART OF COLON: ICD-10-CM

## 2022-12-14 DIAGNOSIS — E78.49 OTHER HYPERLIPIDEMIA: ICD-10-CM

## 2022-12-14 PROCEDURE — G0439 PPPS, SUBSEQ VISIT: HCPCS | Performed by: FAMILY MEDICINE

## 2022-12-14 RX ORDER — OMEPRAZOLE 40 MG/1
CAPSULE, DELAYED RELEASE ORAL
COMMUNITY
Start: 2022-10-07 | End: 2024-07-24

## 2022-12-14 RX ORDER — ATORVASTATIN CALCIUM 20 MG/1
TABLET, FILM COATED ORAL
Qty: 135 TABLET | Refills: 3
Start: 2022-12-14 | End: 2023-01-22

## 2022-12-14 RX ORDER — MULTIVITAMIN WITH IRON
TABLET ORAL
COMMUNITY
Start: 2022-09-01 | End: 2023-10-06

## 2022-12-14 ASSESSMENT — ENCOUNTER SYMPTOMS
CONSTIPATION: 0
EYE PAIN: 0
SHORTNESS OF BREATH: 0
HEADACHES: 0
FEVER: 0
MYALGIAS: 0
CHILLS: 0
HEARTBURN: 0
COUGH: 0
NAUSEA: 0
FREQUENCY: 0
HEMATURIA: 0
PARESTHESIAS: 0
JOINT SWELLING: 1
ABDOMINAL PAIN: 0
PALPITATIONS: 0
HEMATOCHEZIA: 0
DIARRHEA: 0
SORE THROAT: 1
WEAKNESS: 0
NERVOUS/ANXIOUS: 0
DYSURIA: 0
ARTHRALGIAS: 1
DIZZINESS: 0

## 2022-12-14 ASSESSMENT — ACTIVITIES OF DAILY LIVING (ADL): CURRENT_FUNCTION: NO ASSISTANCE NEEDED

## 2022-12-14 NOTE — PROGRESS NOTES
SUBJECTIVE:   Earnest is a 77 year old who presents for Preventive Visit.    This is a pleasant 77-year-old male who presents to the clinic for an annual wellness visit.    At a previous visit we reviewed history of palpitations.  An EKG revealed a right bundle branch block.  He therefore was referred for an echocardiogram that revealed a normal ejection fraction of 60-65%.  No significant valvular abnormalities were seen.  There is a possibility of early diastolic dysfunction.  His blood pressure has been more elevated.  He has been getting multiple blood pressure readings at home.  Systolic blood pressure readings are typically in the 120s and 130s with occasional readings in the 140s.  Diastolic readings are typically in the 60s and 70s.  His average blood pressure is 130/71 with an average heart rate of 72.  He has been working on remaining physically active.  He does note that he can feel mildly short of breath but that seems to be related more to the heat.     He has a history of hypercholesterolemia.  He has been taking atorvastatin 20 mg daily.     His medical history is otherwise notable for prostate cancer, hyperlipidemia, gastroesophageal reflux disease with a known hiatal hernia, as well as benign polyps.  Additionally, he has a history of prostate cancer with a previous radical prostatectomy.  He has had a penile prosthesis given a history of erectile dysfunction.      He also has a history of Dupuytren's contracture and did have surgical repair of both hansd by orthopedics previously.     He takes a multivitamin and also takes omeprazole for reflux symptoms.  He had an upper endoscopy in August of 2021 showing an esophageal polyp but was otherwise normal.     He has a history of kidney stones and has followed up with urology.       Patient has been advised of split billing requirements and indicates understanding: Yes  Are you in the first 12 months of your Medicare coverage?  No    Healthy Habits:     In  "general, how would you rate your overall health?  Excellent    Frequency of exercise:  4-5 days/week    Duration of exercise:  15-30 minutes    Do you usually eat at least 4 servings of fruit and vegetables a day, include whole grains    & fiber and avoid regularly eating high fat or \"junk\" foods?  No    Taking medications regularly:  Yes    Medication side effects:  None    Ability to successfully perform activities of daily living:  No assistance needed    Home Safety:  No safety concerns identified    Hearing Impairment:  Difficulty following a conversation in a noisy restaurant or crowded room and difficulty understanding soft or whispered speech    In the past 6 months, have you been bothered by leaking of urine?  No    In general, how would you rate your overall mental or emotional health?  Excellent      PHQ-2 Total Score: 0    Additional concerns today:  Yes      Have you ever done Advance Care Planning? (For example, a Health Directive, POLST, or a discussion with a medical provider or your loved ones about your wishes): Yes, patient states has an Advance Care Planning document and will bring a copy to the clinic.       Fall risk  Fallen 2 or more times in the past year?: No  Any fall with injury in the past year?: No    Cognitive Screening   1) Repeat 3 items (Leader, Season, Table)    2) Clock draw: NORMAL  3) 3 item recall:Recalls 3 objects  Results: 3 items recalled: COGNITIVE IMPAIRMENT LESS LIKELY    Mini-CogTM Copyright ROBERT Hernadez. Licensed by the author for use in [facility]; reprinted with permission (gary@.[facility]). All rights reserved.      Do you have sleep apnea, excessive snoring or daytime drowsiness?: no    Reviewed and updated as needed this visit by clinical staff   Tobacco  Allergies  Meds              Reviewed and updated as needed this visit by Provider                 Social History     Tobacco Use     Smoking status: Never     Smokeless tobacco: Never   Substance Use " Topics     Alcohol use: Not on file     If you drink alcohol do you typically have >3 drinks per day or >7 drinks per week? No    Alcohol Use 12/14/2022   Prescreen: >3 drinks/day or >7 drinks/week? -   Prescreen: >3 drinks/day or >7 drinks/week? No   No flowsheet data found.        Current providers sharing in care for this patient include:Patient Care Team:  Scar Ambrosio MD as PCP - General (Family Practice)  Scar Ambrosio MD as Assigned PCP    The following health maintenance items are reviewed in Epic and correct as of today:  Health Maintenance   Topic Date Due     ANNUAL REVIEW OF HM ORDERS  Never done     COLORECTAL CANCER SCREENING  09/18/2022     MEDICARE ANNUAL WELLNESS VISIT  12/07/2022     DTAP/TDAP/TD IMMUNIZATION (3 - Td or Tdap) 11/11/2023     FALL RISK ASSESSMENT  12/14/2023     LIPID  11/23/2027     ADVANCE CARE PLANNING  12/14/2027     HEPATITIS C SCREENING  Completed     PHQ-2 (once per calendar year)  Completed     INFLUENZA VACCINE  Completed     Pneumococcal Vaccine: 65+ Years  Completed     ZOSTER IMMUNIZATION  Completed     AORTIC ANEURYSM SCREENING (SYSTEM ASSIGNED)  Completed     COVID-19 Vaccine  Completed     IPV IMMUNIZATION  Aged Out     MENINGITIS IMMUNIZATION  Aged Out     Patient Active Problem List   Diagnosis     Male Erectile Disorder     Overweight     Compression Fracture Of Thoracic Vertebral Body     Decrease In Height     Seborrheic Keratosis     Lentigo     Rotator Cuff Tendon Tear     Eustachian Tube Dysfunction     Hemangioma Of The Skin     History of prostate cancer     Skin Neoplasm Of Uncertain Behavior     Hypercholesterolemia     Esophageal Reflux     Dupuytren's Contracture     Benign Adenomatous Polyp Of The Large Intestine     Nephrolithiasis     Esophagitis     Past Surgical History:   Procedure Laterality Date     OTHER SURGICAL HISTORY      Right hand surgery for Dupuytren's contracture     ZZC REMV PROSTATE,SUPRAPUBIC,SUBTOTAL      Description:  "Prostatectomy, Suprapubic;  Recorded: 10/18/2010;       Social History     Tobacco Use     Smoking status: Never     Smokeless tobacco: Never   Substance Use Topics     Alcohol use: Not on file     History reviewed. No pertinent family history.      Current Outpatient Medications   Medication Sig Dispense Refill     atorvastatin (LIPITOR) 20 MG tablet Take 1 1/2 PO Qday 135 tablet 3     magnesium 250 MG tablet        MULTIVIT &MINERALS/FERROUS FUM (MULTI VITAMIN ORAL) [MULTIVIT &MINERALS/FERROUS FUM (MULTI VITAMIN ORAL)] Take by mouth.       omeprazole (PRILOSEC) 40 MG DR capsule TAKE 1 CAPSULE BY MOUTH TWICE DAILY, 30 MINUTES BEFORE BREAKFAST AND 30 MINUTES BEFORE DINNER       No Known Allergies          Review of Systems   Constitutional: Negative for chills and fever.   HENT: Positive for sore throat. Negative for congestion, ear pain and hearing loss.    Eyes: Negative for pain and visual disturbance.   Respiratory: Negative for cough and shortness of breath.    Cardiovascular: Negative for chest pain, palpitations and peripheral edema.   Gastrointestinal: Negative for abdominal pain, constipation, diarrhea, heartburn, hematochezia and nausea.   Genitourinary: Negative for dysuria, frequency, genital sores, hematuria, impotence, penile discharge and urgency.   Musculoskeletal: Positive for arthralgias and joint swelling. Negative for myalgias.   Skin: Negative for rash.   Neurological: Negative for dizziness, weakness, headaches and paresthesias.   Psychiatric/Behavioral: Negative for mood changes. The patient is not nervous/anxious.          OBJECTIVE:   BP (!) 152/83 (BP Location: Left arm, Patient Position: Sitting, Cuff Size: Adult Regular)   Pulse 92   Temp 98.3  F (36.8  C) (Oral)   Resp 16   Ht 1.689 m (5' 6.5\")   Wt 77.5 kg (170 lb 12.8 oz)   SpO2 97%   BMI 27.15 kg/m   Estimated body mass index is 27.15 kg/m  as calculated from the following:    Height as of this encounter: 1.689 m (5' 6.5\").    " Weight as of this encounter: 77.5 kg (170 lb 12.8 oz).  Physical Exam  GENERAL: healthy, alert and no distress  EYES: Eyes grossly normal to inspection, PERRL and conjunctivae and sclerae normal  HENT: ear canals and TM's normal, nose and mouth without ulcers or lesions  NECK: no adenopathy, no asymmetry, masses, or scars and thyroid normal to palpation  RESP: lungs clear to auscultation - no rales, rhonchi or wheezes  CV: regular rate and rhythm, normal S1 S2, no S3 or S4, no murmur, click or rub, no peripheral edema and peripheral pulses strong  ABDOMEN: soft, nontender  MS: no gross musculoskeletal defects noted, no edema  SKIN: no suspicious lesions or rashes  NEURO: Normal strength and tone, mentation intact and speech normal  PSYCH: mentation appears normal, affect normal/bright    Diagnostic Test Results:  Labs reviewed in Epic    ASSESSMENT / PLAN:   Adebayo was seen today for wellness visit.    Diagnoses and all orders for this visit:    Encounter for Medicare annual wellness exam    Benign neoplasm of colon, unspecified part of colon    His colonoscopy is up-to-date from September 2018.  He is due in September of 2023    Other hyperlipidemia    Previous cholesterol was 189  Continue atorvastatin  -     atorvastatin (LIPITOR) 20 MG tablet; Take 1 1/2 PO Qday  -     Cancel: Lipid panel reflex to direct LDL Fasting; Future  -     Basic metabolic panel  (Ca, Cl, CO2, Creat, Gluc, K, Na, BUN); Future    History of prostate cancer    Follow-up with urology     Dupuytren's Contracture    S/P Surgical repair    GERD    Continue omeprazole    History of elevated blood pressure    He has multiple blood pressure readings from home which are typically normotensive  He may have an element of whitecoat hypertension    Patient has been advised of split billing requirements and indicates understanding: Yes      COUNSELING:  Reviewed preventive health counseling, as reflected in patient instructions       Regular exercise    "    Healthy diet/nutrition       Alcohol Use        Colon cancer screening       Prostate cancer screening      BMI:   Estimated body mass index is 27.15 kg/m  as calculated from the following:    Height as of this encounter: 1.689 m (5' 6.5\").    Weight as of this encounter: 77.5 kg (170 lb 12.8 oz).   Weight management plan: Discussed healthy diet and exercise guidelines      He reports that he has never smoked. He has never used smokeless tobacco.      Appropriate preventive services were discussed with this patient, including applicable screening as appropriate for cardiovascular disease, diabetes, osteopenia/osteoporosis, and glaucoma.  As appropriate for age/gender, discussed screening for colorectal cancer, prostate cancer, breast cancer, and cervical cancer. Checklist reviewing preventive services available has been given to the patient.    Reviewed patients plan of care and provided an AVS. The Basic Care Plan (routine screening as documented in Health Maintenance) for Adebayo meets the Care Plan requirement. This Care Plan has been established and reviewed with the Patient.          Scar Ambrosio MD  [facility]    Identified Health Risks:  "

## 2022-12-14 NOTE — PROGRESS NOTES
"    The patient was counseled and encouraged to consider modifying their diet and eating habits. He was provided with information on recommended healthy diet options.  The patient was provided with written information regarding signs of hearing loss.  Answers for HPI/ROS submitted by the patient on 12/7/2022  In general, how would you rate your overall physical health?: excellent  Frequency of exercise:: 4-5 days/week  Do you usually eat at least 4 servings of fruit and vegetables a day, include whole grains & fiber, and avoid regularly eating high fat or \"junk\" foods? : No  Taking medications regularly:: Yes  Medication side effects:: None  Activities of Daily Living: no assistance needed  Home safety: no safety concerns identified  Hearing Impairment:: difficulty following a conversation in a noisy restaurant or crowded room, difficulty understanding soft or whispered speech  In the past 6 months, have you been bothered by leaking of urine?: No  abdominal pain: No  Blood in stool: No  Blood in urine: No  chest pain: No  chills: No  congestion: No  constipation: No  cough: No  diarrhea: No  dizziness: No  ear pain: No  eye pain: No  nervous/anxious: No  fever: No  frequency: No  genital sores: No  headaches: No  hearing loss: No  heartburn: No  arthralgias: Yes  joint swelling: Yes  peripheral edema: No  mood changes: No  myalgias: No  nausea: No  dysuria: No  palpitations: No  Skin sensation changes: No  sore throat: Yes  urgency: No  rash: No  shortness of breath: No  visual disturbance: No  weakness: No  impotence: No  penile discharge: No  In general, how would you rate your overall mental or emotional health?: excellent  Additional concerns today:: Yes  Duration of exercise:: 15-30 minutes      "

## 2022-12-16 ENCOUNTER — LAB (OUTPATIENT)
Dept: LAB | Facility: CLINIC | Age: 77
End: 2022-12-16
Payer: COMMERCIAL

## 2022-12-16 DIAGNOSIS — E78.49 OTHER HYPERLIPIDEMIA: ICD-10-CM

## 2022-12-16 LAB
ANION GAP SERPL CALCULATED.3IONS-SCNC: 12 MMOL/L (ref 7–15)
BUN SERPL-MCNC: 19.6 MG/DL (ref 8–23)
CALCIUM SERPL-MCNC: 9.4 MG/DL (ref 8.8–10.2)
CHLORIDE SERPL-SCNC: 104 MMOL/L (ref 98–107)
CREAT SERPL-MCNC: 1.25 MG/DL (ref 0.67–1.17)
DEPRECATED HCO3 PLAS-SCNC: 24 MMOL/L (ref 22–29)
GFR SERPL CREATININE-BSD FRML MDRD: 60 ML/MIN/1.73M2
GLUCOSE SERPL-MCNC: 105 MG/DL (ref 70–99)
POTASSIUM SERPL-SCNC: 4.9 MMOL/L (ref 3.4–5.3)
SODIUM SERPL-SCNC: 140 MMOL/L (ref 136–145)

## 2022-12-16 PROCEDURE — 36415 COLL VENOUS BLD VENIPUNCTURE: CPT

## 2022-12-16 PROCEDURE — 80048 BASIC METABOLIC PNL TOTAL CA: CPT

## 2023-04-11 ENCOUNTER — TRANSFERRED RECORDS (OUTPATIENT)
Dept: HEALTH INFORMATION MANAGEMENT | Facility: CLINIC | Age: 78
End: 2023-04-11
Payer: COMMERCIAL

## 2023-08-23 ENCOUNTER — LAB (OUTPATIENT)
Dept: LAB | Facility: CLINIC | Age: 78
End: 2023-08-23
Payer: COMMERCIAL

## 2023-08-23 DIAGNOSIS — E78.49 OTHER HYPERLIPIDEMIA: ICD-10-CM

## 2023-08-23 LAB
ALBUMIN SERPL BCG-MCNC: 4.6 G/DL (ref 3.5–5.2)
ALP SERPL-CCNC: 80 U/L (ref 40–129)
ALT SERPL W P-5'-P-CCNC: 30 U/L (ref 0–70)
ANION GAP SERPL CALCULATED.3IONS-SCNC: 13 MMOL/L (ref 7–15)
AST SERPL W P-5'-P-CCNC: 29 U/L (ref 0–45)
BILIRUB DIRECT SERPL-MCNC: <0.2 MG/DL (ref 0–0.3)
BILIRUB SERPL-MCNC: 0.5 MG/DL
BUN SERPL-MCNC: 24.1 MG/DL (ref 8–23)
CALCIUM SERPL-MCNC: 9.7 MG/DL (ref 8.8–10.2)
CHLORIDE SERPL-SCNC: 102 MMOL/L (ref 98–107)
CHOLEST SERPL-MCNC: 174 MG/DL
CREAT SERPL-MCNC: 1.31 MG/DL (ref 0.67–1.17)
DEPRECATED HCO3 PLAS-SCNC: 22 MMOL/L (ref 22–29)
GFR SERPL CREATININE-BSD FRML MDRD: 56 ML/MIN/1.73M2
GLUCOSE SERPL-MCNC: 111 MG/DL (ref 70–99)
HDLC SERPL-MCNC: 65 MG/DL
LDLC SERPL CALC-MCNC: 97 MG/DL
NONHDLC SERPL-MCNC: 109 MG/DL
POTASSIUM SERPL-SCNC: 4.3 MMOL/L (ref 3.4–5.3)
PROT SERPL-MCNC: 7.1 G/DL (ref 6.4–8.3)
SODIUM SERPL-SCNC: 137 MMOL/L (ref 136–145)
TRIGL SERPL-MCNC: 62 MG/DL

## 2023-08-23 PROCEDURE — 80053 COMPREHEN METABOLIC PANEL: CPT

## 2023-08-23 PROCEDURE — 36415 COLL VENOUS BLD VENIPUNCTURE: CPT

## 2023-08-23 PROCEDURE — 82248 BILIRUBIN DIRECT: CPT

## 2023-08-23 PROCEDURE — 80061 LIPID PANEL: CPT

## 2023-09-25 ENCOUNTER — TRANSFERRED RECORDS (OUTPATIENT)
Dept: HEALTH INFORMATION MANAGEMENT | Facility: CLINIC | Age: 78
End: 2023-09-25
Payer: COMMERCIAL

## 2023-09-29 ENCOUNTER — MYC MEDICAL ADVICE (OUTPATIENT)
Dept: FAMILY MEDICINE | Facility: CLINIC | Age: 78
End: 2023-09-29

## 2023-09-29 ENCOUNTER — TELEPHONE (OUTPATIENT)
Dept: FAMILY MEDICINE | Facility: CLINIC | Age: 78
End: 2023-09-29

## 2023-09-29 DIAGNOSIS — R73.09 ELEVATED GLUCOSE: ICD-10-CM

## 2023-09-29 DIAGNOSIS — E78.5 HYPERLIPIDEMIA, UNSPECIFIED HYPERLIPIDEMIA TYPE: Primary | ICD-10-CM

## 2023-09-29 DIAGNOSIS — Z85.46 HISTORY OF PROSTATE CANCER: ICD-10-CM

## 2023-09-29 NOTE — TELEPHONE ENCOUNTER
LMTCB, provider out today and we will need to reschedule patient. Some times available next week are; Please assist in scheduling patient when he calls back        Monday, October 2      3 pm  Tuesday, Oct 3        8:20  Wed, Oct 4                11:40  Friday, Oct 6             7:20        I can find time for others as needed. Thanks!      JM

## 2023-10-06 ENCOUNTER — OFFICE VISIT (OUTPATIENT)
Dept: FAMILY MEDICINE | Facility: CLINIC | Age: 78
End: 2023-10-06
Payer: COMMERCIAL

## 2023-10-06 VITALS
BODY MASS INDEX: 25.33 KG/M2 | OXYGEN SATURATION: 100 % | TEMPERATURE: 98.7 F | HEIGHT: 67 IN | SYSTOLIC BLOOD PRESSURE: 157 MMHG | RESPIRATION RATE: 16 BRPM | DIASTOLIC BLOOD PRESSURE: 77 MMHG | HEART RATE: 68 BPM | WEIGHT: 161.4 LBS

## 2023-10-06 DIAGNOSIS — E78.00 HYPERCHOLESTEROLEMIA: ICD-10-CM

## 2023-10-06 DIAGNOSIS — R79.89 ELEVATED SERUM CREATININE: ICD-10-CM

## 2023-10-06 DIAGNOSIS — N20.0 NEPHROLITHIASIS: ICD-10-CM

## 2023-10-06 DIAGNOSIS — I10 ESSENTIAL HYPERTENSION: Primary | ICD-10-CM

## 2023-10-06 PROCEDURE — 90662 IIV NO PRSV INCREASED AG IM: CPT | Performed by: FAMILY MEDICINE

## 2023-10-06 PROCEDURE — 99214 OFFICE O/P EST MOD 30 MIN: CPT | Mod: 25 | Performed by: FAMILY MEDICINE

## 2023-10-06 PROCEDURE — G0008 ADMIN INFLUENZA VIRUS VAC: HCPCS | Performed by: FAMILY MEDICINE

## 2023-10-06 RX ORDER — LOSARTAN POTASSIUM 25 MG/1
25 TABLET ORAL DAILY
Qty: 90 TABLET | Refills: 1 | Status: SHIPPED | OUTPATIENT
Start: 2023-10-06 | End: 2024-04-24

## 2023-10-06 NOTE — PROGRESS NOTES
"  Assessment & Plan     Essential hypertension    Reviewed home blood pressures which are elevated  Recommended trial of losartan 25 mg daily  He can consider taking 12.5 mg initially to make sure he tolerates this  Reviewed side effects  Follow-up to recheck his basic metabolic panel  Continue to monitor blood pressure    - losartan (COZAAR) 25 MG tablet; Take 1 tablet (25 mg) by mouth daily    Elevated serum creatinine    Continue to monitor  Recommend avoiding nephrotoxic substances  Recommend good control of blood pressure  Continue to drink plenty of liquid  Recheck basic metabolic panel and check urinalysis at the next check    Hypercholesterolemia    Continue atorvastatin    Nephrolithiasis    Recommend increase fluids  Continue to monitor    Review of external notes as documented elsewhere in note         BMI:   Estimated body mass index is 25.66 kg/m  as calculated from the following:    Height as of this encounter: 1.689 m (5' 6.5\").    Weight as of this encounter: 73.2 kg (161 lb 6.4 oz).           Scar Ambrosio MD  [facility]    Severo Tinoco is a 78 year old, presenting for the following health issues:  Follow Up    Earnest presents to the clinic today to review his blood pressure.  He has a listing of his home blood pressure readings.  The average is 126/71 but he will have some readings that are a bit higher.  He also has tended to be higher while in the clinic.  His kidney testing has been abnormal recently with a creatinine of 1.31 and GFR of 56.  He states that he has been drinking a lot of liquids.  Has been physically active and has tolerated physical exertion without difficulty.    His medical history is reviewed.    At a previous visit we reviewed history of palpitations.  An EKG revealed a right bundle branch block.  He therefore was referred for an echocardiogram that revealed a normal ejection fraction of 60-65%.  No significant valvular abnormalities were " seen.  There is a possibility of early diastolic dysfunction.  His blood pressure has been more elevated.  He has been getting multiple blood pressure readings at home.  Systolic blood pressure readings are typically in the 120s and 130s with occasional readings in the 140s.  Diastolic readings are typically in the 60s and 70s.  His average blood pressure is 130/71 with an average heart rate of 72.  He has been working on remaining physically active.  He does note that he can feel mildly short of breath but that seems to be related more to the heat.     He has a history of hypercholesterolemia.  He has been taking atorvastatin 20 mg daily.     His medical history is otherwise notable for prostate cancer, hyperlipidemia, gastroesophageal reflux disease with a known hiatal hernia, as well as benign polyps.  Additionally, he has a history of prostate cancer with a previous radical prostatectomy.  He has had a penile prosthesis given a history of erectile dysfunction.             10/6/2023    10:59 AM   Additional Questions   Roomed by Kim LINTON CMA       History of Present Illness       CKD: He is not using over the counter pain medicine.     Diabetes:   He presents for follow up of diabetes.    He is not checking blood glucose.        He is concerned about other.    He is not experiencing numbness or burning in feet, excessive thirst, blurry vision, weight changes or redness, sores or blisters on feet.           He eats 0-1 servings of fruits and vegetables daily.He consumes 0 sweetened beverage(s) daily.He exercises with enough effort to increase his heart rate 30 to 60 minutes per day.  He exercises with enough effort to increase his heart rate 6 days per week.   He is taking medications regularly.                 Review of Systems   Constitutional, HEENT, cardiovascular, pulmonary, GI, , musculoskeletal, neuro, skin, endocrine and psych systems are negative, except as otherwise noted.      Objective    BP (!) 157/77  "(BP Location: Left arm, Patient Position: Sitting, Cuff Size: Adult Regular)   Pulse 68   Temp 98.7  F (37.1  C) (Oral)   Resp 16   Ht 1.689 m (5' 6.5\")   Wt 73.2 kg (161 lb 6.4 oz)   SpO2 100%   BMI 25.66 kg/m    Body mass index is 25.66 kg/m .  Physical Exam   GENERAL: healthy, alert and no distress  PSYCH: mentation appears normal, affect normal/bright                      "

## 2023-10-06 NOTE — PATIENT INSTRUCTIONS
Earnest,    For your blood pressure I sent a prescription for losartan 25 mg to your pharmacy  Great job with drinking a lot of water  Continue to avoid medications that are hard your kidneys including ibuprofen, Advil, or Aleve  Tylenol would be a safer alternative  Continue to monitor your blood pressure    You received a flu shot today  You can get your COVID-vaccine at your local pharmacy  You can also consider the RSV vaccine    Scar Ambrosio MD

## 2023-10-13 DIAGNOSIS — E78.49 OTHER HYPERLIPIDEMIA: ICD-10-CM

## 2023-10-13 RX ORDER — ATORVASTATIN CALCIUM 20 MG/1
TABLET, FILM COATED ORAL
Qty: 135 TABLET | Refills: 2 | Status: SHIPPED | OUTPATIENT
Start: 2023-10-13 | End: 2024-07-22

## 2023-11-14 ENCOUNTER — PATIENT OUTREACH (OUTPATIENT)
Dept: CARE COORDINATION | Facility: CLINIC | Age: 78
End: 2023-11-14
Payer: COMMERCIAL

## 2023-11-28 ENCOUNTER — PATIENT OUTREACH (OUTPATIENT)
Dept: CARE COORDINATION | Facility: CLINIC | Age: 78
End: 2023-11-28
Payer: COMMERCIAL

## 2024-01-28 ENCOUNTER — HEALTH MAINTENANCE LETTER (OUTPATIENT)
Age: 79
End: 2024-01-28

## 2024-03-25 SDOH — HEALTH STABILITY: PHYSICAL HEALTH: ON AVERAGE, HOW MANY DAYS PER WEEK DO YOU ENGAGE IN MODERATE TO STRENUOUS EXERCISE (LIKE A BRISK WALK)?: 7 DAYS

## 2024-03-25 SDOH — HEALTH STABILITY: PHYSICAL HEALTH: ON AVERAGE, HOW MANY MINUTES DO YOU ENGAGE IN EXERCISE AT THIS LEVEL?: 50 MIN

## 2024-03-25 ASSESSMENT — SOCIAL DETERMINANTS OF HEALTH (SDOH): HOW OFTEN DO YOU GET TOGETHER WITH FRIENDS OR RELATIVES?: TWICE A WEEK

## 2024-03-25 NOTE — COMMUNITY RESOURCES LIST (ENGLISH)
March 25, 2024           YOUR PERSONALIZED LIST OF SERVICES & PROGRAMS               Bill Payment Assistance      [employer]  Phone: [phone]  Website: [email or web address]  Language: English  Hours: Mon 7:00 AM - 7:00 PM Tue 7:00 AM - 7:00 PM Wed 7:00 AM - 7:00 PM Thu 7:00 AM - 7:00 PM Fri 7:00 AM - 7:00 PM  Fee: Free      - Dislocated Worker/Adult [employer] Employment Program  Phone: [phone]) [phone]  Email: [email or web address]  Website: https://[employer]/services/employment-services/[email or web address]-worker-program/  Language: English, Egyptian  Hours: Mon 8:00 AM - 4:30 PM Tue 8:00 AM - 4:30 PM Wed 8:00 AM - 4:30 PM Thu 8:00 AM - 4:30 PM Fri 8:00 AM - 4:30 PM  Fee: Free  Accessibility: [place] accessible       [employer] HOMEOWNER [employer] (HAF) PROGRAM  Phone: [phone]) [phone]  Website: http://www.[employer].org               IMPORTANT NUMBERS & WEBSITES        Emergency Services  911  .   [facility]  211 http://211unitedway.org  .   Poison Control  (467) 917-2721 http://mnpoison.org http://wis[place]poison.org  .     Suicide and Crisis Lifeline  988 http://988lifeline.org  .   Childhelp National Child Abuse Hotline  323.798.1460 http://Childhelphotline.org   .   National Sexual Assault Hotline  (296) 655-2882 (HOPE) http://Rainn.org   .     National Runaway Safeline  (148) 314-7947 (RUNAWAY) http://1800runaway.org  .   Pregnancy & Postpartum Support  Call/text 031-572-2200  [place]: http://ppsupportmn.org  [place]: http://Harimata.com/wi  .   Substance Abuse National Helpline ([facility])  686-849-HELP (0109) http://Findtreatment.gov   .                DISCLAIMER: [employer] does not endorse any service providers mentioned in this resource list. [facility] does not guarantee that the services mentioned in this resource list will be available to you or will improve your health or wellness.    [facility]

## 2024-03-26 ENCOUNTER — LAB (OUTPATIENT)
Dept: LAB | Facility: CLINIC | Age: 79
End: 2024-03-26
Payer: COMMERCIAL

## 2024-03-26 DIAGNOSIS — E78.5 HYPERLIPIDEMIA, UNSPECIFIED HYPERLIPIDEMIA TYPE: ICD-10-CM

## 2024-03-26 DIAGNOSIS — R73.09 ELEVATED GLUCOSE: ICD-10-CM

## 2024-03-26 DIAGNOSIS — Z12.5 SCREENING FOR PROSTATE CANCER: Primary | ICD-10-CM

## 2024-03-26 LAB
ALBUMIN SERPL BCG-MCNC: 4.3 G/DL (ref 3.5–5.2)
ALP SERPL-CCNC: 75 U/L (ref 40–150)
ALT SERPL W P-5'-P-CCNC: 25 U/L (ref 0–70)
ANION GAP SERPL CALCULATED.3IONS-SCNC: 12 MMOL/L (ref 7–15)
AST SERPL W P-5'-P-CCNC: 27 U/L (ref 0–45)
BILIRUB DIRECT SERPL-MCNC: <0.2 MG/DL (ref 0–0.3)
BILIRUB SERPL-MCNC: 0.7 MG/DL
BUN SERPL-MCNC: 18.2 MG/DL (ref 8–23)
CALCIUM SERPL-MCNC: 9.3 MG/DL (ref 8.8–10.2)
CHLORIDE SERPL-SCNC: 99 MMOL/L (ref 98–107)
CHOLEST SERPL-MCNC: 165 MG/DL
CREAT SERPL-MCNC: 1.18 MG/DL (ref 0.67–1.17)
DEPRECATED HCO3 PLAS-SCNC: 24 MMOL/L (ref 22–29)
EGFRCR SERPLBLD CKD-EPI 2021: 64 ML/MIN/1.73M2
ERYTHROCYTE [DISTWIDTH] IN BLOOD BY AUTOMATED COUNT: 12.3 % (ref 10–15)
FASTING STATUS PATIENT QL REPORTED: YES
GLUCOSE SERPL-MCNC: 102 MG/DL (ref 70–99)
HBA1C MFR BLD: 5.6 % (ref 0–5.6)
HCT VFR BLD AUTO: 44.6 % (ref 40–53)
HDLC SERPL-MCNC: 66 MG/DL
HGB BLD-MCNC: 14.5 G/DL (ref 13.3–17.7)
LDLC SERPL CALC-MCNC: 86 MG/DL
MCH RBC QN AUTO: 29.1 PG (ref 26.5–33)
MCHC RBC AUTO-ENTMCNC: 32.5 G/DL (ref 31.5–36.5)
MCV RBC AUTO: 90 FL (ref 78–100)
NONHDLC SERPL-MCNC: 99 MG/DL
PLATELET # BLD AUTO: 188 10E3/UL (ref 150–450)
POTASSIUM SERPL-SCNC: 4.7 MMOL/L (ref 3.4–5.3)
PROT SERPL-MCNC: 7.3 G/DL (ref 6.4–8.3)
PSA SERPL DL<=0.01 NG/ML-MCNC: <0.01 NG/ML (ref 0–6.5)
RBC # BLD AUTO: 4.98 10E6/UL (ref 4.4–5.9)
SODIUM SERPL-SCNC: 135 MMOL/L (ref 135–145)
TRIGL SERPL-MCNC: 64 MG/DL
WBC # BLD AUTO: 5.7 10E3/UL (ref 4–11)

## 2024-03-26 PROCEDURE — 85027 COMPLETE CBC AUTOMATED: CPT

## 2024-03-26 PROCEDURE — 80053 COMPREHEN METABOLIC PANEL: CPT

## 2024-03-26 PROCEDURE — G0103 PSA SCREENING: HCPCS

## 2024-03-26 PROCEDURE — 80061 LIPID PANEL: CPT

## 2024-03-26 PROCEDURE — 82248 BILIRUBIN DIRECT: CPT

## 2024-03-26 PROCEDURE — 36415 COLL VENOUS BLD VENIPUNCTURE: CPT

## 2024-03-26 PROCEDURE — 83036 HEMOGLOBIN GLYCOSYLATED A1C: CPT

## 2024-04-01 ENCOUNTER — MEDICAL CORRESPONDENCE (OUTPATIENT)
Dept: HEALTH INFORMATION MANAGEMENT | Facility: CLINIC | Age: 79
End: 2024-04-01

## 2024-04-01 ENCOUNTER — OFFICE VISIT (OUTPATIENT)
Dept: FAMILY MEDICINE | Facility: CLINIC | Age: 79
End: 2024-04-01
Payer: COMMERCIAL

## 2024-04-01 VITALS
RESPIRATION RATE: 16 BRPM | BODY MASS INDEX: 26.06 KG/M2 | WEIGHT: 166 LBS | HEART RATE: 70 BPM | HEIGHT: 67 IN | OXYGEN SATURATION: 98 % | DIASTOLIC BLOOD PRESSURE: 68 MMHG | SYSTOLIC BLOOD PRESSURE: 123 MMHG

## 2024-04-01 DIAGNOSIS — N18.31 CKD STAGE 3A, GFR 45-59 ML/MIN (H): ICD-10-CM

## 2024-04-01 DIAGNOSIS — I10 ESSENTIAL HYPERTENSION: ICD-10-CM

## 2024-04-01 DIAGNOSIS — Z23 NEED FOR TDAP VACCINATION: ICD-10-CM

## 2024-04-01 DIAGNOSIS — Z29.11 NEED FOR VACCINATION AGAINST RESPIRATORY SYNCYTIAL VIRUS: ICD-10-CM

## 2024-04-01 DIAGNOSIS — K21.9 GASTROESOPHAGEAL REFLUX DISEASE WITHOUT ESOPHAGITIS: ICD-10-CM

## 2024-04-01 DIAGNOSIS — E78.00 HYPERCHOLESTEROLEMIA: ICD-10-CM

## 2024-04-01 DIAGNOSIS — Z85.46 HISTORY OF PROSTATE CANCER: ICD-10-CM

## 2024-04-01 DIAGNOSIS — D12.6 BENIGN NEOPLASM OF COLON, UNSPECIFIED PART OF COLON: ICD-10-CM

## 2024-04-01 DIAGNOSIS — Z00.00 ENCOUNTER FOR MEDICARE ANNUAL WELLNESS EXAM: Primary | ICD-10-CM

## 2024-04-01 PROBLEM — K20.90 ESOPHAGITIS: Status: RESOLVED | Noted: 2020-05-12 | Resolved: 2024-04-01

## 2024-04-01 PROCEDURE — G0439 PPPS, SUBSEQ VISIT: HCPCS | Performed by: FAMILY MEDICINE

## 2024-04-01 PROCEDURE — 99213 OFFICE O/P EST LOW 20 MIN: CPT | Mod: 25 | Performed by: FAMILY MEDICINE

## 2024-04-01 RX ORDER — RESPIRATORY SYNCYTIAL VIRUS VACCINE 120MCG/0.5
0.5 KIT INTRAMUSCULAR ONCE
Qty: 1 EACH | Refills: 0 | Status: CANCELLED | OUTPATIENT
Start: 2024-04-01 | End: 2024-04-01

## 2024-04-01 NOTE — PROGRESS NOTES
"Preventive Care Visit  [facility]  Scar Ambrosio MD, Family Medicine  Apr 1, 2024      Assessment & Plan     Encounter for Medicare annual wellness exam      Need for vaccination against respiratory syncytial virus  Need for Tdap vaccination    He will consider boosters through his pharmacy    Hypercholesterolemia    His most recent LDL was 86  Continue atorvastatin 20 mg daily    CKD stage 3a, GFR 45-59 ml/min (H)  His most recent creatinine was 1.18 with a GFR of 64 and therefore showed improvement  He will continue to monitor his blood pressure  Continue losartan  Recommend avoiding NSAIDs and encourage plenty of liquids  Will continue to monitor    Essential hypertension  Reviewed his home blood pressure readings which are excellent  Continue losartan    Benign neoplasm of colon, unspecified part of colon  He had 5 adenomatous polyps with his colonoscopy in September 2023 and he will be due in 2026    Gastroesophageal reflux disease without esophagitis  He will continue with omeprazole which has been helpful    History of prostate cancer  His recent PSA was normal  Follow-up with urology as needed              BMI  Estimated body mass index is 26 kg/m  as calculated from the following:    Height as of this encounter: 1.702 m (5' 7\").    Weight as of this encounter: 75.3 kg (166 lb).       Counseling  Appropriate preventive services were discussed with this patient, including applicable screening as appropriate for fall prevention, nutrition, physical activity, Tobacco-use cessation, weight loss and cognition.  Checklist reviewing preventive services available has been given to the patient.  Reviewed patient's diet, addressing concerns and/or questions.   He is at risk for psychosocial distress and has been provided with information to reduce risk.   The patient was provided with written information regarding signs of hearing loss.           Subjective   Earnest is a 78 year old, " presenting for the following:  Wellness Visit        4/1/2024    10:18 AM   Additional Questions   Roomed by Kim LINTON CMA         Health Care Directive  Patient does not have a Health Care Directive or Living Will: Patient states has Advance Directive and will bring in a copy to clinic.    JAVIER Tinoco is a pleasant 78-year-old male who presents to the clinic for an annual wellness visit.  He presents for a medication check as well to review his recent laboratory testing as well as his cholesterol and blood pressure.    He continues to take losartan for hypertension.  He has a list of his home blood pressure readings which are quite good.  Most of his systolic blood pressure readings are in the 120s with an average of 123.  Diastolic readings are in the 60s and 70s with an average of 68.      He had laboratory testing and his total cholesterol was 165 with an LDL of 86.  His hemoglobin A1c was 5.6% and glucose was 102.  His GFR improved to 64.  Creatinine was elevated 1.18.     His medical history is otherwise notable for prostate cancer, hyperlipidemia, gastroesophageal reflux disease with a known hiatal hernia, as well as benign polyps.      He has a history of prostate cancer with a previous radical prostatectomy.  He has had a penile prosthesis given a history of erectile dysfunction.    He did follow-up with the [facility] given that his prosthetic was leaking last year.  They did move the pump to the left groin area from the right side.  He has had some discomfort in the right groin area.    His colonoscopy is up-to-date from September 2023.  He was noted to have 5 colon polyps and therefore will be due in 2026.          3/25/2024   General Health   How would you rate your overall physical health? Excellent   Feel stress (tense, anxious, or unable to sleep) Only a little   (!) STRESS CONCERN      3/25/2024   Nutrition   Diet: Low salt         3/25/2024   Exercise   Days per week of moderate/strenous exercise 7  days   Average minutes spent exercising at this level 50 min         3/25/2024   Social Factors   Frequency of gathering with friends or relatives Twice a week   Worry food won't last until get money to buy more No   Food not last or not have enough money for food? No   Do you have housing?  Yes   Are you worried about losing your housing? No   Lack of transportation? No   Unable to get utilities (heat,electricity)? Yes   Want help with housing or utility concern? No   (!) FINANCIAL RESOURCE STRAIN CONCERN      3/25/2024   Activities of Daily Living- Home Safety   Needs help with the following daily activites None of the above   Safety concerns in the home None of the above         3/25/2024   Dental   Dentist two times every year? Yes         3/25/2024   Hearing Screening   Hearing concerns? (!) I NEED TO ASK PEOPLE TO SPEAK UP OR REPEAT THEMSELVES.    (!) IT'S HARD TO FOLLOW A CONVERSATION IN A NOISY RESTAURANT OR CROWDED ROOM.    (!) TROUBLE UNDERSTANDING SOFT OR WHISPERED SPEECH.         3/25/2024   Driving Risk Screening   Patient/family members have concerns about driving No         3/25/2024   General Alertness/Fatigue Screening   Have you been more tired than usual lately? No         3/25/2024   Urinary Incontinence Screening   Bothered by leaking urine in past 6 months No         3/25/2024   TB Screening   Were you born outside of the [place]? No         Today's PHQ-2 Score:       4/1/2024    10:10 AM   PHQ-2 ( 1999 [employer])   Q1: Little interest or pleasure in doing things 0   Q2: Feeling down, depressed or hopeless 0   PHQ-2 Score 0   Q1: Little interest or pleasure in doing things Not at all   Q2: Feeling down, depressed or hopeless Not at all   PHQ-2 Score 0           3/25/2024   Substance Use   Alcohol more than 3/day or more than 7/wk No   Do you have a current opioid prescription? No   How severe/bad is pain from 1 to 10? 1/10   Do you use any other substances recreationally? (!) ALCOHOL     Social History      Tobacco Use    Smoking status: Never    Smokeless tobacco: Never   Vaping Use    Vaping Use: Never used       ASCVD Risk   The 10-year ASCVD risk score (Karis MAXWELL, et al., 2019) is: 33.8%    Values used to calculate the score:      Age: 78 years      Sex: Male      Is Non- : No      Diabetic: No      Tobacco smoker: No      Systolic Blood Pressure: 151 mmHg      Is BP treated: Yes      HDL Cholesterol: 66 mg/dL      Total Cholesterol: 165 mg/dL            Reviewed and updated as needed this visit by Provider                    No past medical history on file.  Past Surgical History:   Procedure Laterality Date    OTHER SURGICAL HISTORY      Right hand surgery for Dupuytren's contracture    ZZC REMV PROSTATE,SUPRAPUBIC,SUBTOTAL      Description: Prostatectomy, Suprapubic;  Recorded: 10/18/2010;     Current providers sharing in care for this patient include:  Patient Care Team:  Scar Ambrosio MD as PCP - General (Family Practice)  Scar Ambrosio MD as Assigned PCP    The following health maintenance items are reviewed in Epic and correct as of today:  Health Maintenance   Topic Date Due    ANNUAL REVIEW OF HM ORDERS  Never done    RSV VACCINE (Pregnancy & 60+) (1 - 1-dose 60+ series) Never done    DTAP/TDAP/TD IMMUNIZATION (3 - Td or Tdap) 11/11/2023    MEDICARE ANNUAL WELLNESS VISIT  12/14/2023    LIPID  03/26/2025    FALL RISK ASSESSMENT  04/01/2025    COLORECTAL CANCER SCREENING  09/25/2026    GLUCOSE  03/26/2027    ADVANCE CARE PLANNING  12/25/2027    HEPATITIS C SCREENING  Completed    PHQ-2 (once per calendar year)  Completed    INFLUENZA VACCINE  Completed    Pneumococcal Vaccine: 65+ Years  Completed    ZOSTER IMMUNIZATION  Completed    COVID-19 Vaccine  Completed    IPV IMMUNIZATION  Aged Out    HPV IMMUNIZATION  Aged Out    MENINGITIS IMMUNIZATION  Aged Out    RSV MONOCLONAL ANTIBODY  Aged Out         Review of Systems  Constitutional, HEENT, cardiovascular,  "pulmonary, gi and gu systems are negative, except as otherwise noted.     Objective    Exam  BP (!) 151/66 (BP Location: Left arm, Patient Position: Sitting, Cuff Size: Adult Regular)   Pulse 70   Resp 16   Ht 1.702 m (5' 7\")   Wt 75.3 kg (166 lb)   SpO2 98%   BMI 26.00 kg/m     Estimated body mass index is 26 kg/m  as calculated from the following:    Height as of this encounter: 1.702 m (5' 7\").    Weight as of this encounter: 75.3 kg (166 lb).    Physical Exam  GENERAL: alert and no distress  EYES: Eyes grossly normal to inspection, PERRL and conjunctivae and sclerae normal  HENT: ear canals and TM's normal, nose and mouth without ulcers or lesions  NECK: no adenopathy, no asymmetry, masses, or scars  RESP: lungs clear to auscultation - no rales, rhonchi or wheezes  CV: regular rate and rhythm, normal S1 S2, no S3 or S4, no murmur, click or rub, no peripheral edema  ABDOMEN: soft, nontender  : No inguinal hernia noted on the right side  MS: no gross musculoskeletal defects noted, no edema  SKIN: no suspicious lesions or rashes  NEURO: Normal strength and tone, mentation intact and speech normal  PSYCH: mentation appears normal, affect normal/bright         4/1/2024   Mini Cog   Clock Draw Score 2 Normal   3 Item Recall 2 objects recalled   Mini Cog Total Score 4              Signed Electronically by: Scar Ambrosio MD    "

## 2024-04-01 NOTE — PATIENT INSTRUCTIONS
Preventive Care Advice   This is general advice given by our system to help you stay healthy. However, your care team may have specific advice just for you. Please talk to your care team about your preventive care needs.  Nutrition  Eat 5 or more servings of fruits and vegetables each day.  Try wheat bread, brown rice and whole grain pasta (instead of white bread, rice, and pasta).  Get enough calcium and vitamin D. Check the label on foods and aim for 100% of the RDA (recommended daily allowance).  Lifestyle  Exercise at least 150 minutes each week   (30 minutes a day, 5 days a week).  Do muscle strengthening activities 2 days a week. These help control your weight and prevent disease.  No smoking.  Wear sunscreen to prevent skin cancer.  Have a dental exam and cleaning every 6 months.    Earnest,    It was good to see you  As we discussed your kidney testing improved with your most recent tests  Your glucose was mildly elevated so continue to limit carbohydrates in your diet  Make sure to drink plenty of liquids  Colonoscopy will be due in September of 2026  You can consider the RSV and tetanus boosters as well as a COVID booster  Continue to monitor your blood pressure    Scar Ambrosio MD

## 2024-04-17 ENCOUNTER — TRANSFERRED RECORDS (OUTPATIENT)
Dept: HEALTH INFORMATION MANAGEMENT | Facility: CLINIC | Age: 79
End: 2024-04-17
Payer: COMMERCIAL

## 2024-04-23 DIAGNOSIS — I10 ESSENTIAL HYPERTENSION: ICD-10-CM

## 2024-04-24 RX ORDER — LOSARTAN POTASSIUM 25 MG/1
25 TABLET ORAL DAILY
Qty: 90 TABLET | Refills: 2 | Status: SHIPPED | OUTPATIENT
Start: 2024-04-24

## 2024-07-20 DIAGNOSIS — E78.49 OTHER HYPERLIPIDEMIA: ICD-10-CM

## 2024-07-22 RX ORDER — ATORVASTATIN CALCIUM 20 MG/1
TABLET, FILM COATED ORAL
Qty: 135 TABLET | Refills: 1 | Status: SHIPPED | OUTPATIENT
Start: 2024-07-22

## 2024-07-24 DIAGNOSIS — K21.9 GASTROESOPHAGEAL REFLUX DISEASE WITHOUT ESOPHAGITIS: Primary | ICD-10-CM

## 2024-07-24 RX ORDER — OMEPRAZOLE 40 MG/1
40 CAPSULE, DELAYED RELEASE ORAL DAILY
Qty: 90 CAPSULE | Refills: 0 | Status: SHIPPED | OUTPATIENT
Start: 2024-07-24 | End: 2024-10-22

## 2024-09-19 NOTE — PATIENT INSTRUCTIONS
Patient Education   Personalized Prevention Plan  You are due for the preventive services outlined below.  Your care team is available to assist you in scheduling these services.  If you have already completed any of these items, please share that information with your care team to update in your medical record.  Health Maintenance Due   Topic Date Due     ANNUAL REVIEW OF HM ORDERS  Never done     Colorectal Cancer Screening  09/18/2022       Understanding USDA MyPlate  The USDA has guidelines to help you make healthy food choices. These are called MyPlate. MyPlate shows the food groups that make up healthy meals using the image of a place setting. Before you eat, think about the healthiest choices for what to put on your plate or in your cup or bowl. To learn more about building a healthy plate, visit www.choosemyplate.gov.    The food groups    Fruits. Any fruit or 100% fruit juice counts as part of the Fruit Group. Fruits may be fresh, canned, frozen, or dried, and may be whole, cut-up, or pureed. Make 1/2 of your plate fruits and vegetables.    Vegetables. Any vegetable or 100% vegetable juice counts as a member of the Vegetable Group. Vegetables may be fresh, frozen, canned, or dried. They can be served raw or cooked and may be whole, cut-up, or mashed. Make 1/2 of your plate fruits and vegetables.    Grains. All foods made from grains are part of the Grains Group. These include wheat, rice, oats, cornmeal, and barley. Grains are often used to make foods such as bread, pasta, oatmeal, cereal, tortillas, and grits. Grains should be no more than 1/4 of your plate. At least half of your grains should be whole grains.    Protein. This group includes meat, poultry, seafood, beans and peas, eggs, processed soy products (such as tofu), nuts (including nut butters), and seeds. Make protein choices no more than 1/4 of your plate. Meat and poultry choices should be lean or low fat.    Dairy. The Dairy Group includes  all fluid milk products and foods made from milk that contain calcium, such as yogurt and cheese. (Foods that have little calcium, such as cream, butter, and cream cheese, are not part of this group.) Most dairy choices should be low-fat or fat-free.    Oils. Oils aren't a food group, but they do contain essential nutrients. However it's important to watch your intake of oils. These are fats that are liquid at room temperature. They include canola, corn, olive, soybean, vegetable, and sunflower oil. Foods that are mainly oil include mayonnaise, certain salad dressings, and soft margarines. You likely already get your daily oil allowance from the foods you eat.  Things to limit  Eating healthy also means limiting these things in your diet:       Salt (sodium). Many processed foods have a lot of sodium. To keep sodium intake down, eat fresh vegetables, meats, poultry, and seafood when possible. Purchase low-sodium, reduced-sodium, or no-salt-added food products at the store. And don't add salt to your meals at home. Instead, season them with herbs and spices such as dill, oregano, cumin, and paprika. Or try adding flavor with lemon or lime zest and juice.    Saturated fat. Saturated fats are most often found in animal products such as beef, pork, and chicken. They are often solid at room temperature, such as butter. To reduce your saturated fat intake, choose leaner cuts of meat and poultry. And try healthier cooking methods such as grilling, broiling, roasting, or baking. For a simple lower-fat swap, use plain nonfat yogurt instead of mayonnaise when making potato salad or macaroni salad.    Added sugars. These are sugars added to foods. They are in foods such as ice cream, candy, soda, fruit drinks, sports drinks, energy drinks, cookies, pastries, jams, and syrups. Cut down on added sugars by sharing sweet treats with a family member or friend. You can also choose fruit for dessert, and drink water or other  unsweetened beverages.     [employer] last reviewed this educational content on 6/1/2020 2000-2021 [employer]. All rights reserved. This information is not intended as a substitute for professional medical care. Always follow your healthcare professional's instructions.          Signs of Hearing Loss      Hearing much better with one ear can be a sign of hearing loss.   Hearing loss is a problem shared by many people. In fact, it is one of the most common health problems, particularly as people age. Most people age 65 and older have some hearing loss. By age 80, almost everyone does. Hearing loss often occurs slowly over the years. So you may not realize your hearing has gotten worse.  Have your hearing checked  Call your healthcare provider if you:    Have to strain to hear normal conversation    Have to watch other people s faces very carefully to follow what they re saying    Need to ask people to repeat what they ve said    Often misunderstand what people are saying    Turn the volume of the television or radio up so high that others complain    Feel that people are mumbling when they re talking to you    Find that the effort to hear leaves you feeling tired and irritated    Notice, when using the phone, that you hear better with one ear than the other  [employer] last reviewed this educational content on 1/1/2020 2000-2021 [employer]. All rights reserved. This information is not intended as a substitute for professional medical care. Always follow your healthcare professional's instructions.            single

## 2024-11-11 DIAGNOSIS — K21.9 GASTROESOPHAGEAL REFLUX DISEASE WITHOUT ESOPHAGITIS: ICD-10-CM

## 2024-11-13 RX ORDER — OMEPRAZOLE 40 MG/1
CAPSULE, DELAYED RELEASE ORAL
Qty: 90 CAPSULE | Refills: 2 | Status: SHIPPED | OUTPATIENT
Start: 2024-11-13

## 2025-01-23 ENCOUNTER — TELEPHONE (OUTPATIENT)
Dept: FAMILY MEDICINE | Facility: CLINIC | Age: 80
End: 2025-01-23
Payer: COMMERCIAL

## 2025-01-23 DIAGNOSIS — E78.49 OTHER HYPERLIPIDEMIA: ICD-10-CM

## 2025-01-23 DIAGNOSIS — Z12.5 SCREENING FOR PROSTATE CANCER: ICD-10-CM

## 2025-01-23 DIAGNOSIS — I10 ESSENTIAL HYPERTENSION: Primary | ICD-10-CM

## 2025-01-23 DIAGNOSIS — R73.09 ELEVATED GLUCOSE: ICD-10-CM

## 2025-01-23 NOTE — TELEPHONE ENCOUNTER
General Call      Reason for Call:  patient called in and wanted to have Dr. Merritt place lab orders for his AWV the second week in April.   Please call patient to let them know if this was placed.       Could we send this information to you in [employer] or would you prefer to receive a phone call?:   Patient would prefer a phone call   Okay to leave a detailed message?: Yes at Cell number on file:    Telephone Information:   Mobile [phone]

## 2025-01-23 NOTE — TELEPHONE ENCOUNTER
Relayed the provider's message below: The patient stated that he will call back to schedule the lab a couple of days before his appointment on 4/7/25. He had no further questions or concerns.

## 2025-04-21 ENCOUNTER — TRANSFERRED RECORDS (OUTPATIENT)
Dept: HEALTH INFORMATION MANAGEMENT | Facility: CLINIC | Age: 80
End: 2025-04-21
Payer: COMMERCIAL

## 2025-04-22 ENCOUNTER — LAB (OUTPATIENT)
Dept: LAB | Facility: CLINIC | Age: 80
End: 2025-04-22
Payer: COMMERCIAL

## 2025-04-22 DIAGNOSIS — E78.49 OTHER HYPERLIPIDEMIA: ICD-10-CM

## 2025-04-22 DIAGNOSIS — I10 ESSENTIAL HYPERTENSION: ICD-10-CM

## 2025-04-22 DIAGNOSIS — R73.09 ELEVATED GLUCOSE: ICD-10-CM

## 2025-04-22 DIAGNOSIS — Z12.5 SCREENING FOR PROSTATE CANCER: ICD-10-CM

## 2025-04-22 LAB
ALBUMIN SERPL BCG-MCNC: 4.4 G/DL (ref 3.5–5.2)
ALP SERPL-CCNC: 74 U/L (ref 40–150)
ALT SERPL W P-5'-P-CCNC: 29 U/L (ref 0–70)
ANION GAP SERPL CALCULATED.3IONS-SCNC: 11 MMOL/L (ref 7–15)
AST SERPL W P-5'-P-CCNC: 29 U/L (ref 0–45)
BILIRUB DIRECT SERPL-MCNC: 0.19 MG/DL (ref 0–0.3)
BILIRUB SERPL-MCNC: 0.7 MG/DL
BUN SERPL-MCNC: 17.9 MG/DL (ref 8–23)
CALCIUM SERPL-MCNC: 9.6 MG/DL (ref 8.8–10.4)
CHLORIDE SERPL-SCNC: 100 MMOL/L (ref 98–107)
CHOLEST SERPL-MCNC: 192 MG/DL
CREAT SERPL-MCNC: 1.25 MG/DL (ref 0.67–1.17)
EGFRCR SERPLBLD CKD-EPI 2021: 59 ML/MIN/1.73M2
ERYTHROCYTE [DISTWIDTH] IN BLOOD BY AUTOMATED COUNT: 12.3 % (ref 10–15)
EST. AVERAGE GLUCOSE BLD GHB EST-MCNC: 117 MG/DL
FASTING STATUS PATIENT QL REPORTED: YES
FASTING STATUS PATIENT QL REPORTED: YES
GLUCOSE SERPL-MCNC: 107 MG/DL (ref 70–99)
HBA1C MFR BLD: 5.7 % (ref 0–5.6)
HCO3 SERPL-SCNC: 26 MMOL/L (ref 22–29)
HCT VFR BLD AUTO: 45 % (ref 40–53)
HDLC SERPL-MCNC: 59 MG/DL
HGB BLD-MCNC: 14.7 G/DL (ref 13.3–17.7)
LDLC SERPL CALC-MCNC: 111 MG/DL
MCH RBC QN AUTO: 29.1 PG (ref 26.5–33)
MCHC RBC AUTO-ENTMCNC: 32.7 G/DL (ref 31.5–36.5)
MCV RBC AUTO: 89 FL (ref 78–100)
NONHDLC SERPL-MCNC: 133 MG/DL
PLATELET # BLD AUTO: 223 10E3/UL (ref 150–450)
POTASSIUM SERPL-SCNC: 4.6 MMOL/L (ref 3.4–5.3)
PROT SERPL-MCNC: 7.1 G/DL (ref 6.4–8.3)
PSA SERPL DL<=0.01 NG/ML-MCNC: <0.01 NG/ML (ref 0–6.5)
RBC # BLD AUTO: 5.06 10E6/UL (ref 4.4–5.9)
SODIUM SERPL-SCNC: 137 MMOL/L (ref 135–145)
TRIGL SERPL-MCNC: 108 MG/DL
WBC # BLD AUTO: 7 10E3/UL (ref 4–11)

## 2025-04-22 PROCEDURE — 82248 BILIRUBIN DIRECT: CPT

## 2025-04-22 PROCEDURE — 85027 COMPLETE CBC AUTOMATED: CPT

## 2025-04-22 PROCEDURE — 80053 COMPREHEN METABOLIC PANEL: CPT

## 2025-04-22 PROCEDURE — G0103 PSA SCREENING: HCPCS

## 2025-04-22 PROCEDURE — 83036 HEMOGLOBIN GLYCOSYLATED A1C: CPT

## 2025-04-22 PROCEDURE — 80061 LIPID PANEL: CPT

## 2025-04-22 PROCEDURE — 36415 COLL VENOUS BLD VENIPUNCTURE: CPT

## 2025-04-23 SDOH — HEALTH STABILITY: PHYSICAL HEALTH: ON AVERAGE, HOW MANY MINUTES DO YOU ENGAGE IN EXERCISE AT THIS LEVEL?: 20 MIN

## 2025-04-23 SDOH — HEALTH STABILITY: PHYSICAL HEALTH: ON AVERAGE, HOW MANY DAYS PER WEEK DO YOU ENGAGE IN MODERATE TO STRENUOUS EXERCISE (LIKE A BRISK WALK)?: 7 DAYS

## 2025-04-23 ASSESSMENT — SOCIAL DETERMINANTS OF HEALTH (SDOH): HOW OFTEN DO YOU GET TOGETHER WITH FRIENDS OR RELATIVES?: ONCE A WEEK

## 2025-04-28 ENCOUNTER — OFFICE VISIT (OUTPATIENT)
Dept: FAMILY MEDICINE | Facility: CLINIC | Age: 80
End: 2025-04-28
Payer: COMMERCIAL

## 2025-04-28 DIAGNOSIS — K21.9 GASTROESOPHAGEAL REFLUX DISEASE WITHOUT ESOPHAGITIS: ICD-10-CM

## 2025-04-28 DIAGNOSIS — N18.31 CKD STAGE 3A, GFR 45-59 ML/MIN (H): ICD-10-CM

## 2025-04-28 DIAGNOSIS — Z00.00 ENCOUNTER FOR MEDICARE ANNUAL WELLNESS EXAM: Primary | ICD-10-CM

## 2025-04-28 DIAGNOSIS — D12.6 BENIGN NEOPLASM OF COLON, UNSPECIFIED PART OF COLON: ICD-10-CM

## 2025-04-28 DIAGNOSIS — I10 ESSENTIAL HYPERTENSION: ICD-10-CM

## 2025-04-28 DIAGNOSIS — E78.00 HYPERCHOLESTEROLEMIA: ICD-10-CM

## 2025-04-28 PROCEDURE — G2211 COMPLEX E/M VISIT ADD ON: HCPCS | Performed by: FAMILY MEDICINE

## 2025-04-28 PROCEDURE — 3074F SYST BP LT 130 MM HG: CPT | Performed by: FAMILY MEDICINE

## 2025-04-28 PROCEDURE — G0439 PPPS, SUBSEQ VISIT: HCPCS | Performed by: FAMILY MEDICINE

## 2025-04-28 PROCEDURE — 99214 OFFICE O/P EST MOD 30 MIN: CPT | Mod: 25 | Performed by: FAMILY MEDICINE

## 2025-04-28 PROCEDURE — 3078F DIAST BP <80 MM HG: CPT | Performed by: FAMILY MEDICINE

## 2025-04-28 NOTE — PATIENT INSTRUCTIONS
Patient Education   Preventive Care Advice   This is general advice given by our system to help you stay healthy. However, your care team may have specific advice just for you. Please talk to your care team about your preventive care needs.  Nutrition  Eat 5 or more servings of fruits and vegetables each day.  Try wheat bread, brown rice and whole grain pasta (instead of white bread, rice, and pasta).  Get enough calcium and vitamin D. Check the label on foods and aim for 100% of the RDA (recommended daily allowance).  Lifestyle  Exercise at least 150 minutes each week  (30 minutes a day, 5 days a week).  Do muscle strengthening activities 2 days a week. These help control your weight and prevent disease.  No smoking.  Wear sunscreen to prevent skin cancer.  Have a dental exam and cleaning every 6 months.  Yearly exams  See your health care team every year to talk about:  Any changes in your health.  Any medicines your care team has prescribed.  Preventive care, family planning, and ways to prevent chronic diseases.  Shots (vaccines)   HPV shots (up to age 26), if you've never had them before.  Hepatitis B shots (up to age 59), if you've never had them before.  COVID-19 shot: Get this shot when it's due.  Flu shot: Get a flu shot every year.  Tetanus shot: Get a tetanus shot every 10 years.  Pneumococcal, hepatitis A, and RSV shots: Ask your care team if you need these based on your risk.  Shingles shot (for age 50 and up)  General health tests  Diabetes screening:  Starting at age 35, Get screened for diabetes at least every 3 years.  If you are younger than age 35, ask your care team if you should be screened for diabetes.  Cholesterol test: At age 39, start having a cholesterol test every 5 years, or more often if advised.  Bone density scan (DEXA): At age 50, ask your care team if you should have this scan for osteoporosis (brittle bones).  Hepatitis C: Get tested at least once in your life.  STIs (sexually  transmitted infections)  Before age 24: Ask your care team if you should be screened for STIs.  After age 24: Get screened for STIs if you're at risk. You are at risk for STIs (including HIV) if:  You are sexually active with more than one person.  You don't use condoms every time.  You or a partner was diagnosed with a sexually transmitted infection.  If you are at risk for HIV, ask about PrEP medicine to prevent HIV.  Get tested for HIV at least once in your life, whether you are at risk for HIV or not.  Cancer screening tests  Cervical cancer screening: If you have a cervix, begin getting regular cervical cancer screening tests starting at age 21.  Breast cancer scan (mammogram): If you've ever had breasts, begin having regular mammograms starting at age 40. This is a scan to check for breast cancer.  Colon cancer screening: It is important to start screening for colon cancer at age 45.  Have a colonoscopy test every 10 years (or more often if you're at risk) Or, ask your provider about stool tests like a FIT test every year or Cologuard test every 3 years.  To learn more about your testing options, visit:   .  For help making a decision, visit:   https://bit.ly/wd89294.  Prostate cancer screening test: If you have a prostate, ask your care team if a prostate cancer screening test (PSA) at age 55 is right for you.  Lung cancer screening: If you are a current or former smoker ages 50 to 80, ask your care team if ongoing lung cancer screenings are right for you.  For informational purposes only. Not to replace the advice of your health care provider. Copyright   2023 [facility] [employer]. All rights reserved. Clinically reviewed by the [facility]. [employer] 077078 - REV 01/24.  Hearing Loss: Care Instructions  Overview     Hearing loss is a sudden or slow decrease in how well you hear. It can range from slight to profound. Permanent hearing loss can occur with aging. It also can  happen when you are exposed long-term to loud noise. Examples include listening to loud music, riding motorcycles, or being around other loud machines.  Hearing loss can affect your work and home life. It can make you feel lonely or depressed. You may feel that you have lost your independence. But hearing aids and other devices can help you hear better and feel connected to others.  Follow-up care is a key part of your treatment and safety. Be sure to make and go to all appointments, and call your doctor if you are having problems. It's also a good idea to know your test results and keep a list of the medicines you take.  How can you care for yourself at home?  Avoid loud noises whenever possible. This helps keep your hearing from getting worse.  Always wear hearing protection around loud noises.  Wear a hearing aid as directed.  A professional can help you pick a hearing aid that will work best for you.  You can also get hearing aids over the counter for mild to moderate hearing loss.  Have hearing tests as your doctor suggests. They can show whether your hearing has changed. Your hearing aid may need to be adjusted.  Use other devices as needed. These may include:  Telephone amplifiers and hearing aids that can connect to a television, stereo, radio, or microphone.  Devices that use lights or vibrations. These alert you to the doorbell, a ringing telephone, or a baby monitor.  Television closed-captioning. This shows the words at the bottom of the screen. Most new TVs can do this.  TTY (text telephone). This lets you type messages back and forth on the telephone instead of talking or listening. These devices are also called TDD. When messages are typed on the keyboard, they are sent over the phone line to a receiving TTY. The message is shown on a monitor.  Use text messaging, social media, and email if it is hard for you to communicate by telephone.  Try to learn a listening technique called speechreading. It is  "not lipreading. You pay attention to people's gestures, expressions, posture, and tone of voice. These clues can help you understand what a person is saying. Face the person you are talking to, and have them face you. Make sure the lighting is good. You need to see the other person's face clearly.  Think about counseling if you need help to adjust to your hearing loss.  When should you call for help?  Watch closely for changes in your health, and be sure to contact your doctor if:    You think your hearing is getting worse.     You have new symptoms, such as dizziness or nausea.   Where can you learn more?  Go to https://www.i'mma.net/patiented  Enter R798 in the search box to learn more about \"Hearing Loss: Care Instructions.\"  Current as of: October 27, 2024  Content Version: 14.4    4759-3509 [employer]   Care instructions adapted under license by your healthcare professional. If you have questions about a medical condition or this instruction, always ask your healthcare professional. [employer] disclaims any warranty or liability for your use of this information.       "

## 2025-04-28 NOTE — PROGRESS NOTES
"Preventive Care Visit  [facility]  Scar Ambrosio MD, Family Medicine  Apr 28, 2025      Assessment & Plan     Encounter for Medicare annual wellness exam    Reviewed vaccines  Recommend staying physically active    Essential hypertension    Blood pressure initially elevated but improved  Continue losartan  Continue to monitor home blood pressure  Recommend improving diet and exercise  Recent metabolic panel revealed normal electrolytes with a GFR of 59      CKD stage 3a, GFR 45-59 ml/min (H)    Recent GFR was 59  Recommend avoiding NSAIDs and nephrotoxic substances  Recommend good control of blood pressure  Continue losartan    Benign neoplasm of colon, unspecified part of colon  Colonoscopy is up-to-date from September 2023.  He is due in 2028    Gastroesophageal reflux disease without esophagitis    Continue omeprazole as prescribed  His preference is to continue the current dose    Hypercholesterolemia    Continue atorvastatin  Continue to work on diet and exercise    History of prostate cancer  Status post prostatectomy with penile prosthetic          The longitudinal plan of care for the diagnosis(es)/condition(s) as documented were addressed during this visit. Due to the added complexity in care, I will continue to support Earnest in the subsequent management and with ongoing continuity of care.      BMI  Estimated body mass index is 26.22 kg/m  as calculated from the following:    Height as of this encounter: 1.702 m (5' 7\").    Weight as of this encounter: 75.9 kg (167 lb 6.4 oz).       Counseling  Appropriate preventive services were addressed with this patient via screening, questionnaire, or discussion as appropriate for fall prevention, nutrition, physical activity, Tobacco-use cessation, social engagement, weight loss and cognition.  Checklist reviewing preventive services available has been given to the patient.          Subjective   Earnest is a 79 year old, presenting for " the following:  Wellness Visit        4/28/2025     1:56 PM   Additional Questions   Roomed by Kim HERRERA    Earnest is a pleasant 79-year-old male who presents to the clinic for an annual wellness visit.  He presents for a medication check as well to review his recent laboratory testing as well as his cholesterol and blood pressure.    Recent laboratory testing showed a GFR of 59 with a creatinine of 1.25.  Hemoglobin was 14.7.  His hemoglobin A1c was elevated at 5.7%.     He continues to take losartan for hypertension.  He has a list of his home blood pressure readings which are quite good.       His medical history is otherwise notable for prostate cancer, hyperlipidemia, gastroesophageal reflux disease with a known hiatal hernia, as well as benign polyps.       He has a history of prostate cancer with a previous radical prostatectomy.  He has had a penile prosthesis given a history of erectile dysfunction. He did follow-up with the [facility] given that his prosthetic was leaking.  They did move the pump to the left groin area from the right side.  He has had some discomfort in the right groin area.     His colonoscopy is up-to-date from September 2023.  He was noted to have 5 colon polyps and therefore will be due in 2026.               Advance Care Planning    Patient states has Health Care Directive and will send to [employer].        4/23/2025   General Health   How would you rate your overall physical health? Good   Feel stress (tense, anxious, or unable to sleep) Only a little   (!) STRESS CONCERN      4/23/2025   Nutrition   Diet: Regular (no restrictions)         4/23/2025   Exercise   Days per week of moderate/strenous exercise 7 days   Average minutes spent exercising at this level 20 min         4/23/2025   Social Factors   Frequency of gathering with friends or relatives Once a week   Worry food won't last until get money to buy more No   Food not last or not have enough money for  food? No   Do you have housing? (Housing is defined as stable permanent housing and does not include staying outside in a car, in a tent, in an abandoned building, in an overnight shelter, or couch-surfing.) Yes   Are you worried about losing your housing? No   Lack of transportation? No   Unable to get utilities (heat,electricity)? No         4/23/2025   Fall Risk   Fallen 2 or more times in the past year? No   Trouble with walking or balance? No          4/23/2025   Activities of Daily Living- Home Safety   Needs help with the following daily activites None of the above   Safety concerns in the home None of the above         4/23/2025   Dental   Dentist two times every year? Yes         4/23/2025   Hearing Screening   Hearing concerns? (!) IT'S HARD TO FOLLOW A CONVERSATION IN A NOISY RESTAURANT OR CROWDED ROOM.    (!) TROUBLE UNDERSTANDING SOFT OR WHISPERED SPEECH.       Multiple values from one day are sorted in reverse-chronological order         4/23/2025   Driving Risk Screening   Patient/family members have concerns about driving No         4/23/2025   General Alertness/Fatigue Screening   Have you been more tired than usual lately? No         4/23/2025   Urinary Incontinence Screening   Bothered by leaking urine in past 6 months No         Today's PHQ-2 Score:       4/28/2025     1:49 PM   PHQ-2 ( 1999 [employer])   Q1: Little interest or pleasure in doing things 0   Q2: Feeling down, depressed or hopeless 0   PHQ-2 Score 0    Q1: Little interest or pleasure in doing things Not at all   Q2: Feeling down, depressed or hopeless Not at all   PHQ-2 Score 0       Patient-reported           4/23/2025   Substance Use   Alcohol more than 3/day or more than 7/wk No   Do you have a current opioid prescription? No   How severe/bad is pain from 1 to 10? 0/10 (No Pain)   Do you use any other substances recreationally? No     Social History     Tobacco Use    Smoking status: Never    Smokeless tobacco: Never   Vaping Use     Vaping status: Never Used       ASCVD Risk   The 10-year ASCVD risk score (Karis MAXWELL, et al., 2019) is: 38.3%    Values used to calculate the score:      Age: 79 years      Sex: Male      Is Non- : No      Diabetic: No      Tobacco smoker: No      Systolic Blood Pressure: 151 mmHg      Is BP treated: Yes      HDL Cholesterol: 59 mg/dL      Total Cholesterol: 192 mg/dL            Reviewed and updated as needed this visit by Provider                    History reviewed. No pertinent past medical history.  Past Surgical History:   Procedure Laterality Date    OTHER SURGICAL HISTORY      Right hand surgery for Dupuytren's contracture    ZZC REMV PROSTATE,SUPRAPUBIC,SUBTOTAL      Description: Prostatectomy, Suprapubic;  Recorded: 10/18/2010;     Current providers sharing in care for this patient include:  Patient Care Team:  Scar Ambrosio MD as PCP - General (Family Practice)  Scar Ambrosio MD as Assigned PCP    The following health maintenance items are reviewed in Epic and correct as of today:  Health Maintenance   Topic Date Due    MICROALBUMIN  Never done    URINALYSIS  Never done    ANNUAL REVIEW OF HM ORDERS  04/01/2025    MEDICARE ANNUAL WELLNESS VISIT  04/01/2025    COVID-19 Vaccine (8 - 2024-25 season) 05/26/2025    BMP  04/22/2026    LIPID  04/22/2026    HEMOGLOBIN  04/22/2026    FALL RISK ASSESSMENT  04/28/2026    COLORECTAL CANCER SCREENING  09/25/2026    DIABETES SCREENING  04/22/2028    ADVANCE CARE PLANNING  04/01/2029    DTAP/TDAP/TD IMMUNIZATION (4 - Td or Tdap) 04/03/2034    HEPATITIS C SCREENING  Completed    PHQ-2 (once per calendar year)  Completed    INFLUENZA VACCINE  Completed    Pneumococcal Vaccine: 50+ Years  Completed    ZOSTER IMMUNIZATION  Completed    RSV VACCINE  Completed    HPV IMMUNIZATION  Aged Out    MENINGITIS IMMUNIZATION  Aged Out         Review of Systems  Constitutional, HEENT, cardiovascular, pulmonary, gi and gu systems are  "negative, except as otherwise noted.     Objective    Exam  BP (!) 151/70 (BP Location: Left arm, Patient Position: Sitting, Cuff Size: Adult Regular)   Pulse 79   Temp 97.7  F (36.5  C) (Oral)   Resp 16   Ht 1.702 m (5' 7\")   Wt 75.9 kg (167 lb 6.4 oz)   SpO2 97%   BMI 26.22 kg/m     Estimated body mass index is 26.22 kg/m  as calculated from the following:    Height as of this encounter: 1.702 m (5' 7\").    Weight as of this encounter: 75.9 kg (167 lb 6.4 oz).    Physical Exam  GENERAL: alert and no distress  EYES: Eyes grossly normal to inspection, PERRL and conjunctivae and sclerae normal  HENT: ear canals and TM's normal, nose and mouth without ulcers or lesions  NECK: no adenopathy, no asymmetry, masses, or scars  RESP: lungs clear to auscultation - no rales, rhonchi or wheezes  CV: regular rate and rhythm, normal S1 S2, no S3 or S4, no murmur, click or rub, no peripheral edema  MS: no gross musculoskeletal defects noted, no edema  SKIN: no suspicious lesions or rashes  PSYCH: mentation appears normal, affect normal/bright         4/28/2025   Mini Cog   Clock Draw Score 2 Normal   3 Item Recall 3 objects recalled   Mini Cog Total Score 5              Signed Electronically by: Scar Ambrosio MD    "

## 2025-05-04 VITALS
OXYGEN SATURATION: 97 % | HEIGHT: 67 IN | RESPIRATION RATE: 16 BRPM | SYSTOLIC BLOOD PRESSURE: 126 MMHG | TEMPERATURE: 97.7 F | DIASTOLIC BLOOD PRESSURE: 74 MMHG | BODY MASS INDEX: 26.27 KG/M2 | HEART RATE: 79 BPM | WEIGHT: 167.4 LBS

## 2025-05-05 DIAGNOSIS — I10 ESSENTIAL HYPERTENSION: ICD-10-CM

## 2025-05-05 RX ORDER — LOSARTAN POTASSIUM 25 MG/1
25 TABLET ORAL DAILY
Qty: 90 TABLET | Refills: 3 | Status: SHIPPED | OUTPATIENT
Start: 2025-05-05

## 2025-05-21 NOTE — TELEPHONE ENCOUNTER
Hello,    I forwarded time slots that could be used for next week. Can you please find a time that works for him. Thanks!    Scar Ambrosio MD     declines